# Patient Record
Sex: FEMALE | Race: WHITE | NOT HISPANIC OR LATINO | Employment: UNEMPLOYED | URBAN - METROPOLITAN AREA
[De-identification: names, ages, dates, MRNs, and addresses within clinical notes are randomized per-mention and may not be internally consistent; named-entity substitution may affect disease eponyms.]

---

## 2017-06-12 ENCOUNTER — APPOINTMENT (OUTPATIENT)
Dept: PREADMISSION TESTING | Facility: HOSPITAL | Age: 62
End: 2017-06-12

## 2017-06-12 VITALS
HEIGHT: 66 IN | OXYGEN SATURATION: 98 % | HEART RATE: 77 BPM | SYSTOLIC BLOOD PRESSURE: 148 MMHG | WEIGHT: 154 LBS | TEMPERATURE: 97.8 F | DIASTOLIC BLOOD PRESSURE: 88 MMHG | BODY MASS INDEX: 24.75 KG/M2

## 2017-06-12 LAB
ALBUMIN SERPL-MCNC: 4.4 G/DL (ref 3.5–5.2)
ALBUMIN/GLOB SERPL: 1.6 G/DL
ALP SERPL-CCNC: 49 U/L (ref 39–117)
ALT SERPL W P-5'-P-CCNC: 17 U/L (ref 1–33)
ANION GAP SERPL CALCULATED.3IONS-SCNC: 13.8 MMOL/L
AST SERPL-CCNC: 23 U/L (ref 1–32)
BASOPHILS # BLD AUTO: 0.05 10*3/MM3 (ref 0–0.2)
BASOPHILS NFR BLD AUTO: 1.1 % (ref 0–1.5)
BILIRUB SERPL-MCNC: 0.4 MG/DL (ref 0.1–1.2)
BUN BLD-MCNC: 16 MG/DL (ref 8–23)
BUN/CREAT SERPL: 19.8 (ref 7–25)
CALCIUM SPEC-SCNC: 9 MG/DL (ref 8.6–10.5)
CHLORIDE SERPL-SCNC: 100 MMOL/L (ref 98–107)
CO2 SERPL-SCNC: 25.2 MMOL/L (ref 22–29)
CREAT BLD-MCNC: 0.81 MG/DL (ref 0.57–1)
DEPRECATED RDW RBC AUTO: 46.3 FL (ref 37–54)
EOSINOPHIL # BLD AUTO: 0.16 10*3/MM3 (ref 0–0.7)
EOSINOPHIL NFR BLD AUTO: 3.5 % (ref 0.3–6.2)
ERYTHROCYTE [DISTWIDTH] IN BLOOD BY AUTOMATED COUNT: 12.8 % (ref 11.7–13)
GFR SERPL CREATININE-BSD FRML MDRD: 71 ML/MIN/1.73
GLOBULIN UR ELPH-MCNC: 2.7 GM/DL
GLUCOSE BLD-MCNC: 113 MG/DL (ref 65–99)
HCT VFR BLD AUTO: 40 % (ref 35.6–45.5)
HGB BLD-MCNC: 13.2 G/DL (ref 11.9–15.5)
IMM GRANULOCYTES # BLD: 0 10*3/MM3 (ref 0–0.03)
IMM GRANULOCYTES NFR BLD: 0 % (ref 0–0.5)
LYMPHOCYTES # BLD AUTO: 1.3 10*3/MM3 (ref 0.9–4.8)
LYMPHOCYTES NFR BLD AUTO: 28.1 % (ref 19.6–45.3)
MCH RBC QN AUTO: 32.5 PG (ref 26.9–32)
MCHC RBC AUTO-ENTMCNC: 33 G/DL (ref 32.4–36.3)
MCV RBC AUTO: 98.5 FL (ref 80.5–98.2)
MONOCYTES # BLD AUTO: 0.46 10*3/MM3 (ref 0.2–1.2)
MONOCYTES NFR BLD AUTO: 9.9 % (ref 5–12)
NEUTROPHILS # BLD AUTO: 2.66 10*3/MM3 (ref 1.9–8.1)
NEUTROPHILS NFR BLD AUTO: 57.4 % (ref 42.7–76)
PLATELET # BLD AUTO: 168 10*3/MM3 (ref 140–500)
PMV BLD AUTO: 13 FL (ref 6–12)
POTASSIUM BLD-SCNC: 3.9 MMOL/L (ref 3.5–5.2)
PROT SERPL-MCNC: 7.1 G/DL (ref 6–8.5)
RBC # BLD AUTO: 4.06 10*6/MM3 (ref 3.9–5.2)
SODIUM BLD-SCNC: 139 MMOL/L (ref 136–145)
WBC NRBC COR # BLD: 4.63 10*3/MM3 (ref 4.5–10.7)

## 2017-06-12 PROCEDURE — 36415 COLL VENOUS BLD VENIPUNCTURE: CPT

## 2017-06-12 PROCEDURE — 80053 COMPREHEN METABOLIC PANEL: CPT | Performed by: ORTHOPAEDIC SURGERY

## 2017-06-12 PROCEDURE — 93005 ELECTROCARDIOGRAM TRACING: CPT

## 2017-06-12 PROCEDURE — 85025 COMPLETE CBC W/AUTO DIFF WBC: CPT | Performed by: ORTHOPAEDIC SURGERY

## 2017-06-12 PROCEDURE — 93010 ELECTROCARDIOGRAM REPORT: CPT | Performed by: INTERNAL MEDICINE

## 2017-06-12 RX ORDER — ACETAMINOPHEN 500 MG
500 TABLET ORAL EVERY 6 HOURS PRN
Status: ON HOLD | COMMUNITY
End: 2022-05-17

## 2017-06-12 RX ORDER — LORATADINE 10 MG/1
10 CAPSULE, LIQUID FILLED ORAL DAILY PRN
COMMUNITY
End: 2022-05-23 | Stop reason: HOSPADM

## 2017-06-12 RX ORDER — ESCITALOPRAM OXALATE 10 MG/1
10 TABLET ORAL EVERY EVENING
COMMUNITY

## 2017-06-12 RX ORDER — OLMESARTAN MEDOXOMIL 20 MG/1
20 TABLET ORAL NIGHTLY
COMMUNITY
End: 2022-05-23 | Stop reason: HOSPADM

## 2017-06-12 RX ORDER — AMLODIPINE BESYLATE 5 MG/1
5 TABLET ORAL NIGHTLY
COMMUNITY
End: 2022-05-23 | Stop reason: HOSPADM

## 2017-06-12 NOTE — DISCHARGE INSTRUCTIONS
Take the following medications the morning of surgery with a small sip of water: NONE        General Instructions:  • Do not eat or drink anything after midnight the night before surgery.  • Bring any papers given to you in the doctor’s office.  • Wear clean comfortable clothes and socks.  • Do not wear contact lenses or make-up.  Bring a case for your glasses.   • Bring crutches or walker if applicable.  • Leave all other valuables and jewelry at home.  • The Pre-Admission Testing nurse will instruct you to bring medications if unable to obtain an accurate list in Pre-Admission Testing.            Preventing a Surgical Site Infection:  • For 2 to 3 days before surgery, avoid shaving with a razor because the razor can irritate skin and make it easier to develop an infection.  • The night prior to surgery sleep in a clean bed with clean clothing.  Do not allow pets to sleep with you.  • Shower on the morning of surgery using a fresh bar of anti-bacterial soap (such as Dial) and clean washcloth.  Dry with a clean towel and dress in clean clothing.  • Ask your surgeon if you will be receiving antibiotics prior to surgery.  • Make sure you, your family, and all healthcare providers clean their hands with soap and water or an alcohol based hand  before caring for you or your wound.    Day of surgery: 6/26/2017. [facility]. ARRIVAL TIME 545 AM  Upon arrival, a Pre-op nurse and Anesthesiologist will review your health history, obtain vital signs, and answer questions you may have.  The only belongings needed at this time will be your home medications and if applicable your C-PAP/BI-PAP machine.  If you are staying overnight your family can leave the rest of your belongings in the car and bring them to your room later.  A Pre-op nurse will start an IV and you may receive medication in preparation for surgery, including something to help you relax.  Your family will be able to see you in the Pre-op area.  While you are in  surgery your family should notify the waiting room  if they leave the waiting room area and provide a contact phone number.    Please be aware that surgery does come with discomfort.  We want to make every effort to control your discomfort so please discuss any uncontrolled symptoms with your nurse.   Your doctor will most likely have prescribed pain medications.          If you are staying overnight following surgery, you will be transported to your hospital room following the recovery period.  [facility] has all private rooms.    If you have any questions please call Pre-Admission Testing at [phone].  Deductibles and co-payments are collected on the day of service. Please be prepared to pay the required co-pay, deductible or deposit on the day of service as defined by your plan.

## 2017-06-26 ENCOUNTER — HOSPITAL ENCOUNTER (OUTPATIENT)
Facility: HOSPITAL | Age: 62
Setting detail: OBSERVATION
Discharge: HOME | End: 2017-06-27
Attending: ORTHOPAEDIC SURGERY | Admitting: ORTHOPAEDIC SURGERY

## 2017-06-26 ENCOUNTER — ANESTHESIA EVENT (OUTPATIENT)
Dept: PERIOP | Facility: HOSPITAL | Age: 62
End: 2017-06-26

## 2017-06-26 ENCOUNTER — APPOINTMENT (OUTPATIENT)
Dept: GENERAL RADIOLOGY | Facility: HOSPITAL | Age: 62
End: 2017-06-26

## 2017-06-26 ENCOUNTER — ANESTHESIA (OUTPATIENT)
Dept: PERIOP | Facility: HOSPITAL | Age: 62
End: 2017-06-26

## 2017-06-26 DIAGNOSIS — R26.2 DIFFICULTY WALKING: Primary | ICD-10-CM

## 2017-06-26 PROBLEM — M19.072 OSTEOARTHRITIS OF LEFT FOOT: Status: ACTIVE | Noted: 2017-06-26

## 2017-06-26 PROCEDURE — 25010000002 ONDANSETRON PER 1 MG: Performed by: NURSE ANESTHETIST, CERTIFIED REGISTERED

## 2017-06-26 PROCEDURE — C1713 ANCHOR/SCREW BN/BN,TIS/BN: HCPCS | Performed by: ORTHOPAEDIC SURGERY

## 2017-06-26 PROCEDURE — 25010000002 KETOROLAC TROMETHAMINE PER 15 MG: Performed by: ANESTHESIOLOGY

## 2017-06-26 PROCEDURE — 25010000002 DEXAMETHASONE PER 1 MG: Performed by: NURSE ANESTHETIST, CERTIFIED REGISTERED

## 2017-06-26 PROCEDURE — G0378 HOSPITAL OBSERVATION PER HR: HCPCS

## 2017-06-26 PROCEDURE — 25010000002 PROPOFOL 10 MG/ML EMULSION: Performed by: NURSE ANESTHETIST, CERTIFIED REGISTERED

## 2017-06-26 PROCEDURE — 25010000002 FENTANYL CITRATE (PF) 100 MCG/2ML SOLUTION: Performed by: ANESTHESIOLOGY

## 2017-06-26 PROCEDURE — 25010000002 ROPIVACAINE PER 1 MG: Performed by: ANESTHESIOLOGY

## 2017-06-26 PROCEDURE — 73630 X-RAY EXAM OF FOOT: CPT

## 2017-06-26 PROCEDURE — 25010000002 MIDAZOLAM PER 1 MG: Performed by: ANESTHESIOLOGY

## 2017-06-26 PROCEDURE — 25010000003 CEFAZOLIN IN DEXTROSE 2-4 GM/100ML-% SOLUTION: Performed by: NURSE PRACTITIONER

## 2017-06-26 PROCEDURE — 76000 FLUOROSCOPY <1 HR PHYS/QHP: CPT

## 2017-06-26 PROCEDURE — 73600 X-RAY EXAM OF ANKLE: CPT

## 2017-06-26 PROCEDURE — 94799 UNLISTED PULMONARY SVC/PX: CPT

## 2017-06-26 DEVICE — IMPLANTABLE DEVICE: Type: IMPLANTABLE DEVICE | Status: FUNCTIONAL

## 2017-06-26 DEVICE — PLT EDGELOCK: Type: IMPLANTABLE DEVICE | Status: FUNCTIONAL

## 2017-06-26 RX ORDER — OXYCODONE HYDROCHLORIDE AND ACETAMINOPHEN 5; 325 MG/1; MG/1
1 TABLET ORAL EVERY 4 HOURS PRN
Status: DISCONTINUED | OUTPATIENT
Start: 2017-06-26 | End: 2017-06-27 | Stop reason: HOSPADM

## 2017-06-26 RX ORDER — ONDANSETRON 2 MG/ML
INJECTION INTRAMUSCULAR; INTRAVENOUS AS NEEDED
Status: DISCONTINUED | OUTPATIENT
Start: 2017-06-26 | End: 2017-06-26 | Stop reason: SURG

## 2017-06-26 RX ORDER — AMLODIPINE BESYLATE 5 MG/1
5 TABLET ORAL EVERY EVENING
Status: DISCONTINUED | OUTPATIENT
Start: 2017-06-26 | End: 2017-06-27 | Stop reason: HOSPADM

## 2017-06-26 RX ORDER — ACETAMINOPHEN 325 MG/1
650 TABLET ORAL EVERY 4 HOURS PRN
Status: DISCONTINUED | OUTPATIENT
Start: 2017-06-26 | End: 2017-06-27 | Stop reason: HOSPADM

## 2017-06-26 RX ORDER — NALOXONE HCL 0.4 MG/ML
0.2 VIAL (ML) INJECTION AS NEEDED
Status: DISCONTINUED | OUTPATIENT
Start: 2017-06-26 | End: 2017-06-26 | Stop reason: HOSPADM

## 2017-06-26 RX ORDER — MIDAZOLAM HYDROCHLORIDE 1 MG/ML
1 INJECTION INTRAMUSCULAR; INTRAVENOUS
Status: DISCONTINUED | OUTPATIENT
Start: 2017-06-26 | End: 2017-06-26 | Stop reason: HOSPADM

## 2017-06-26 RX ORDER — ROPIVACAINE HYDROCHLORIDE 5 MG/ML
INJECTION, SOLUTION EPIDURAL; INFILTRATION; PERINEURAL AS NEEDED
Status: DISCONTINUED | OUTPATIENT
Start: 2017-06-26 | End: 2017-06-26 | Stop reason: SURG

## 2017-06-26 RX ORDER — DIPHENHYDRAMINE HYDROCHLORIDE 50 MG/ML
12.5 INJECTION INTRAMUSCULAR; INTRAVENOUS
Status: DISCONTINUED | OUTPATIENT
Start: 2017-06-26 | End: 2017-06-26 | Stop reason: HOSPADM

## 2017-06-26 RX ORDER — FLUMAZENIL 0.1 MG/ML
0.2 INJECTION INTRAVENOUS AS NEEDED
Status: DISCONTINUED | OUTPATIENT
Start: 2017-06-26 | End: 2017-06-26 | Stop reason: HOSPADM

## 2017-06-26 RX ORDER — OXYCODONE HYDROCHLORIDE AND ACETAMINOPHEN 5; 325 MG/1; MG/1
2 TABLET ORAL EVERY 4 HOURS PRN
Status: DISCONTINUED | OUTPATIENT
Start: 2017-06-26 | End: 2017-06-27 | Stop reason: HOSPADM

## 2017-06-26 RX ORDER — HYDRALAZINE HYDROCHLORIDE 20 MG/ML
5 INJECTION INTRAMUSCULAR; INTRAVENOUS
Status: DISCONTINUED | OUTPATIENT
Start: 2017-06-26 | End: 2017-06-26 | Stop reason: HOSPADM

## 2017-06-26 RX ORDER — ESCITALOPRAM OXALATE 10 MG/1
10 TABLET ORAL EVERY EVENING
Status: DISCONTINUED | OUTPATIENT
Start: 2017-06-26 | End: 2017-06-27 | Stop reason: HOSPADM

## 2017-06-26 RX ORDER — HYDROCODONE BITARTRATE AND ACETAMINOPHEN 7.5; 325 MG/1; MG/1
1 TABLET ORAL ONCE AS NEEDED
Status: DISCONTINUED | OUTPATIENT
Start: 2017-06-26 | End: 2017-06-26 | Stop reason: HOSPADM

## 2017-06-26 RX ORDER — SODIUM CHLORIDE, SODIUM LACTATE, POTASSIUM CHLORIDE, CALCIUM CHLORIDE 600; 310; 30; 20 MG/100ML; MG/100ML; MG/100ML; MG/100ML
100 INJECTION, SOLUTION INTRAVENOUS CONTINUOUS
Status: DISCONTINUED | OUTPATIENT
Start: 2017-06-26 | End: 2017-06-27 | Stop reason: HOSPADM

## 2017-06-26 RX ORDER — BUPIVACAINE HYDROCHLORIDE AND EPINEPHRINE 2.5; 5 MG/ML; UG/ML
INJECTION, SOLUTION EPIDURAL; INFILTRATION; INTRACAUDAL; PERINEURAL AS NEEDED
Status: DISCONTINUED | OUTPATIENT
Start: 2017-06-26 | End: 2017-06-26 | Stop reason: SURG

## 2017-06-26 RX ORDER — KETOROLAC TROMETHAMINE 15 MG/ML
15 INJECTION, SOLUTION INTRAMUSCULAR; INTRAVENOUS EVERY 8 HOURS
Status: DISCONTINUED | OUTPATIENT
Start: 2017-06-26 | End: 2017-06-27 | Stop reason: HOSPADM

## 2017-06-26 RX ORDER — FENTANYL CITRATE 50 UG/ML
50 INJECTION, SOLUTION INTRAMUSCULAR; INTRAVENOUS
Status: DISCONTINUED | OUTPATIENT
Start: 2017-06-26 | End: 2017-06-26 | Stop reason: HOSPADM

## 2017-06-26 RX ORDER — EPHEDRINE SULFATE 50 MG/ML
INJECTION, SOLUTION INTRAVENOUS AS NEEDED
Status: DISCONTINUED | OUTPATIENT
Start: 2017-06-26 | End: 2017-06-26 | Stop reason: SURG

## 2017-06-26 RX ORDER — ACETAMINOPHEN 500 MG
500 TABLET ORAL EVERY 6 HOURS PRN
Status: DISCONTINUED | OUTPATIENT
Start: 2017-06-26 | End: 2017-06-27 | Stop reason: HOSPADM

## 2017-06-26 RX ORDER — PROMETHAZINE HYDROCHLORIDE 25 MG/1
12.5 TABLET ORAL ONCE AS NEEDED
Status: DISCONTINUED | OUTPATIENT
Start: 2017-06-26 | End: 2017-06-26 | Stop reason: HOSPADM

## 2017-06-26 RX ORDER — ASPIRIN 325 MG
325 TABLET, DELAYED RELEASE (ENTERIC COATED) ORAL DAILY
Status: DISCONTINUED | OUTPATIENT
Start: 2017-06-27 | End: 2017-06-27 | Stop reason: HOSPADM

## 2017-06-26 RX ORDER — SODIUM CHLORIDE, SODIUM LACTATE, POTASSIUM CHLORIDE, CALCIUM CHLORIDE 600; 310; 30; 20 MG/100ML; MG/100ML; MG/100ML; MG/100ML
9 INJECTION, SOLUTION INTRAVENOUS CONTINUOUS
Status: DISCONTINUED | OUTPATIENT
Start: 2017-06-26 | End: 2017-06-27 | Stop reason: HOSPADM

## 2017-06-26 RX ORDER — OLMESARTAN MEDOXOMIL 20 MG/1
20 TABLET ORAL EVERY EVENING
Status: DISCONTINUED | OUTPATIENT
Start: 2017-06-26 | End: 2017-06-27 | Stop reason: HOSPADM

## 2017-06-26 RX ORDER — FAMOTIDINE 10 MG/ML
20 INJECTION, SOLUTION INTRAVENOUS ONCE
Status: COMPLETED | OUTPATIENT
Start: 2017-06-26 | End: 2017-06-26

## 2017-06-26 RX ORDER — CEFAZOLIN SODIUM 2 G/100ML
2 INJECTION, SOLUTION INTRAVENOUS EVERY 8 HOURS
Status: COMPLETED | OUTPATIENT
Start: 2017-06-26 | End: 2017-06-27

## 2017-06-26 RX ORDER — PROMETHAZINE HYDROCHLORIDE 25 MG/1
25 TABLET ORAL EVERY 4 HOURS PRN
Status: DISCONTINUED | OUTPATIENT
Start: 2017-06-26 | End: 2017-06-27 | Stop reason: HOSPADM

## 2017-06-26 RX ORDER — LABETALOL HYDROCHLORIDE 5 MG/ML
5 INJECTION, SOLUTION INTRAVENOUS
Status: DISCONTINUED | OUTPATIENT
Start: 2017-06-26 | End: 2017-06-26 | Stop reason: HOSPADM

## 2017-06-26 RX ORDER — ONDANSETRON 2 MG/ML
4 INJECTION INTRAMUSCULAR; INTRAVENOUS ONCE AS NEEDED
Status: DISCONTINUED | OUTPATIENT
Start: 2017-06-26 | End: 2017-06-26 | Stop reason: HOSPADM

## 2017-06-26 RX ORDER — PROMETHAZINE HYDROCHLORIDE 25 MG/ML
12.5 INJECTION, SOLUTION INTRAMUSCULAR; INTRAVENOUS ONCE AS NEEDED
Status: DISCONTINUED | OUTPATIENT
Start: 2017-06-26 | End: 2017-06-26 | Stop reason: HOSPADM

## 2017-06-26 RX ORDER — CETIRIZINE HYDROCHLORIDE 10 MG/1
10 TABLET ORAL DAILY
Status: DISCONTINUED | OUTPATIENT
Start: 2017-06-27 | End: 2017-06-27 | Stop reason: HOSPADM

## 2017-06-26 RX ORDER — PROMETHAZINE HYDROCHLORIDE 25 MG/1
25 TABLET ORAL ONCE AS NEEDED
Status: DISCONTINUED | OUTPATIENT
Start: 2017-06-26 | End: 2017-06-26 | Stop reason: HOSPADM

## 2017-06-26 RX ORDER — EPHEDRINE SULFATE 50 MG/ML
5 INJECTION, SOLUTION INTRAVENOUS ONCE AS NEEDED
Status: DISCONTINUED | OUTPATIENT
Start: 2017-06-26 | End: 2017-06-26 | Stop reason: HOSPADM

## 2017-06-26 RX ORDER — SODIUM CHLORIDE 0.9 % (FLUSH) 0.9 %
1-10 SYRINGE (ML) INJECTION AS NEEDED
Status: DISCONTINUED | OUTPATIENT
Start: 2017-06-26 | End: 2017-06-26 | Stop reason: HOSPADM

## 2017-06-26 RX ORDER — OXYCODONE AND ACETAMINOPHEN 7.5; 325 MG/1; MG/1
1 TABLET ORAL ONCE AS NEEDED
Status: DISCONTINUED | OUTPATIENT
Start: 2017-06-26 | End: 2017-06-26 | Stop reason: HOSPADM

## 2017-06-26 RX ORDER — ZOLPIDEM TARTRATE 5 MG/1
5 TABLET ORAL NIGHTLY PRN
Status: DISCONTINUED | OUTPATIENT
Start: 2017-06-26 | End: 2017-06-27 | Stop reason: HOSPADM

## 2017-06-26 RX ORDER — LIDOCAINE HYDROCHLORIDE 20 MG/ML
INJECTION, SOLUTION INFILTRATION; PERINEURAL AS NEEDED
Status: DISCONTINUED | OUTPATIENT
Start: 2017-06-26 | End: 2017-06-26 | Stop reason: SURG

## 2017-06-26 RX ORDER — PROMETHAZINE HYDROCHLORIDE 25 MG/ML
25 INJECTION, SOLUTION INTRAMUSCULAR; INTRAVENOUS EVERY 4 HOURS PRN
Status: DISCONTINUED | OUTPATIENT
Start: 2017-06-26 | End: 2017-06-27 | Stop reason: HOSPADM

## 2017-06-26 RX ORDER — HYDROMORPHONE HYDROCHLORIDE 1 MG/ML
0.5 INJECTION, SOLUTION INTRAMUSCULAR; INTRAVENOUS; SUBCUTANEOUS
Status: DISCONTINUED | OUTPATIENT
Start: 2017-06-26 | End: 2017-06-26 | Stop reason: HOSPADM

## 2017-06-26 RX ORDER — MIDAZOLAM HYDROCHLORIDE 1 MG/ML
2 INJECTION INTRAMUSCULAR; INTRAVENOUS
Status: DISCONTINUED | OUTPATIENT
Start: 2017-06-26 | End: 2017-06-26 | Stop reason: HOSPADM

## 2017-06-26 RX ORDER — PROPOFOL 10 MG/ML
VIAL (ML) INTRAVENOUS AS NEEDED
Status: DISCONTINUED | OUTPATIENT
Start: 2017-06-26 | End: 2017-06-26 | Stop reason: SURG

## 2017-06-26 RX ORDER — DEXAMETHASONE SODIUM PHOSPHATE 10 MG/ML
INJECTION INTRAMUSCULAR; INTRAVENOUS AS NEEDED
Status: DISCONTINUED | OUTPATIENT
Start: 2017-06-26 | End: 2017-06-26 | Stop reason: SURG

## 2017-06-26 RX ORDER — PROMETHAZINE HYDROCHLORIDE 25 MG/1
25 SUPPOSITORY RECTAL ONCE AS NEEDED
Status: DISCONTINUED | OUTPATIENT
Start: 2017-06-26 | End: 2017-06-26 | Stop reason: HOSPADM

## 2017-06-26 RX ADMIN — SODIUM CHLORIDE, POTASSIUM CHLORIDE, SODIUM LACTATE AND CALCIUM CHLORIDE 9 ML/HR: 600; 310; 30; 20 INJECTION, SOLUTION INTRAVENOUS at 06:23

## 2017-06-26 RX ADMIN — MIDAZOLAM 2 MG: 1 INJECTION INTRAMUSCULAR; INTRAVENOUS at 06:53

## 2017-06-26 RX ADMIN — KETOROLAC TROMETHAMINE 15 MG: 30 INJECTION, SOLUTION INTRAMUSCULAR at 15:48

## 2017-06-26 RX ADMIN — EPHEDRINE SULFATE 10 MG: 50 INJECTION INTRAMUSCULAR; INTRAVENOUS; SUBCUTANEOUS at 08:08

## 2017-06-26 RX ADMIN — PROPOFOL 150 MG: 10 INJECTION, EMULSION INTRAVENOUS at 07:36

## 2017-06-26 RX ADMIN — OXYCODONE HYDROCHLORIDE AND ACETAMINOPHEN 2 TABLET: 5; 325 TABLET ORAL at 17:39

## 2017-06-26 RX ADMIN — SODIUM CHLORIDE, POTASSIUM CHLORIDE, SODIUM LACTATE AND CALCIUM CHLORIDE: 600; 310; 30; 20 INJECTION, SOLUTION INTRAVENOUS at 08:53

## 2017-06-26 RX ADMIN — ESCITALOPRAM 10 MG: 10 TABLET, FILM COATED ORAL at 17:38

## 2017-06-26 RX ADMIN — FAMOTIDINE 20 MG: 10 INJECTION, SOLUTION INTRAVENOUS at 07:18

## 2017-06-26 RX ADMIN — EPHEDRINE SULFATE 10 MG: 50 INJECTION INTRAMUSCULAR; INTRAVENOUS; SUBCUTANEOUS at 08:45

## 2017-06-26 RX ADMIN — ROPIVACAINE HYDROCHLORIDE 25 ML: 5 INJECTION, SOLUTION EPIDURAL; INFILTRATION; PERINEURAL at 07:15

## 2017-06-26 RX ADMIN — AMLODIPINE BESYLATE 5 MG: 5 TABLET ORAL at 20:00

## 2017-06-26 RX ADMIN — CEFAZOLIN SODIUM 2 G: 2 INJECTION, SOLUTION INTRAVENOUS at 07:34

## 2017-06-26 RX ADMIN — ROPIVACAINE HYDROCHLORIDE 8 ML/HR: 2 INJECTION, SOLUTION EPIDURAL; INFILTRATION at 11:36

## 2017-06-26 RX ADMIN — LIDOCAINE HYDROCHLORIDE 60 MG: 20 INJECTION, SOLUTION INFILTRATION; PERINEURAL at 07:36

## 2017-06-26 RX ADMIN — EPHEDRINE SULFATE 10 MG: 50 INJECTION INTRAMUSCULAR; INTRAVENOUS; SUBCUTANEOUS at 08:20

## 2017-06-26 RX ADMIN — ONDANSETRON 4 MG: 2 INJECTION INTRAMUSCULAR; INTRAVENOUS at 09:42

## 2017-06-26 RX ADMIN — FENTANYL CITRATE 50 MCG: 50 INJECTION INTRAMUSCULAR; INTRAVENOUS at 06:54

## 2017-06-26 RX ADMIN — OLMESARTAN MEDOXOMIL 20 MG: 20 TABLET, FILM COATED ORAL at 20:00

## 2017-06-26 RX ADMIN — DEXAMETHASONE SODIUM PHOSPHATE 4 MG: 10 INJECTION INTRAMUSCULAR; INTRAVENOUS at 09:42

## 2017-06-26 RX ADMIN — BUPIVACAINE HYDROCHLORIDE AND EPINEPHRINE BITARTRATE 20 ML: 2.5; .0091 INJECTION, SOLUTION EPIDURAL; INFILTRATION; INTRACAUDAL; PERINEURAL at 07:15

## 2017-06-26 RX ADMIN — CEFAZOLIN SODIUM 2 G: 2 INJECTION, SOLUTION INTRAVENOUS at 15:49

## 2017-06-26 RX ADMIN — ZOLPIDEM TARTRATE 5 MG: 5 TABLET, FILM COATED ORAL at 22:47

## 2017-06-26 NOTE — ANESTHESIA PROCEDURE NOTES
Peripheral Block    Patient location during procedure: holding area  Reason for block: at surgeon's request and post-op pain management  Performed by  Anesthesiologist: HALEY WILLIAMSON  Preanesthetic Checklist  Completed: patient identified, site marked, surgical consent, pre-op evaluation, timeout performed, IV checked, risks and benefits discussed and monitors and equipment checked  Peripheral Block Prep:  Sterile barriers:cap, gloves and mask  Prep: ChloraPrep  Patient monitoring: blood pressure monitoring, continuous pulse oximetry and EKG  Peripheral Procedure  Sedation:yes  Guidance:ultrasound guided  Images:still images obtained    Laterality:left  Block Type:femoral and adductor canal block  Injection Technique:single-shot  Needle Type:echogenic  ULTRASOUND INTERPRETATION.  Using ultrasound guidance a 21 G gauge needle was placed in close proximity to the femoral nerve, at which point, under ultrasound guidance anesthetic was injected in the area of the nerve and spread of the anesthesia was seen on ultrasound in close proximity thereto.  There were no abnormalities seen on ultrasound; a digital image was taken; and the patient tolerated the procedure with no complications.   Medications  Local Injected:bupivacaine 0.25% Local Amount Injected:20mL  Post Assessment  Injection Assessment: negative aspiration for heme, no paresthesia on injection and incremental injection  Patient Tolerance:comfortable throughout block  Complications:no  Additional Notes  Ultrasound Interpretation:  Using ultrasound guidance the needle was placed in close proximity to the femoral nerve and anesthetic was injected in the area of the femoral nerve and spread of the anesthetic was seen on ultrasound in close proximity thereto.  There were no abnormalities seen on ultrasound; a digital image was taken; and the patient tolerated the procedure with no complications.    Block placed for postoperative pain control per surgeon  request.

## 2017-06-26 NOTE — ANESTHESIA PROCEDURE NOTES
Peripheral Block    Patient location during procedure: holding area  Reason for block: at surgeon's request and post-op pain management  Performed by  Anesthesiologist: HALEY WILLIAMSON  Preanesthetic Checklist  Completed: patient identified, site marked, surgical consent, pre-op evaluation, timeout performed, IV checked, risks and benefits discussed and monitors and equipment checked  Peripheral Block Prep:  Sterile barriers:cap, mask and gloves  Prep: ChloraPrep  Patient monitoring: blood pressure monitoring, continuous pulse oximetry and EKG  Peripheral Procedure  Sedation:yes  Guidance:ultrasound guided  Images:still images obtained    Laterality:left  Block Type:popliteal  Injection Technique:catheter  Needle Type:echogenic  Needle Gauge:18 G  Catheter Size:20 GULTRASOUND INTERPRETATION.  Using ultrasound guidance a 21 G gauge needle was placed in close proximity to the sciatic nerve, at which point, under ultrasound guidance anesthetic was injected in the area of the nerve and spread of the anesthesia was seen on ultrasound in close proximity thereto.  There were no abnormalities seen on ultrasound; a digital image was taken; and the patient tolerated the procedure with no complications.   Medications  Local Injected:ropivacaine 0.5% without epinephrine Local Amount Injected:25mL  Post Assessment  Injection Assessment: negative aspiration for heme, no paresthesia on injection and incremental injection  Patient Tolerance:comfortable throughout block  Complications:no  Additional Notes  Ultrasound Interpretation:  Using ultrasound guidance the needle was placed in close proximity to the popliteal/sciatic nerve and anesthetic was injected in the area of the  popliteal/sciatic nerve and spread of the anesthetic was seen on ultrasound in close proximity thereto.  There were no abnormalities seen on ultrasound; a digital image was taken; and the patient tolerated the procedure with no complications.    Block  placed for postoperative pain control per surgeon request.

## 2017-06-26 NOTE — ANESTHESIA PREPROCEDURE EVALUATION
Anesthesia Evaluation     Patient summary reviewed and Nursing notes reviewed   no history of anesthetic complications:  NPO Solid Status: > 8 hours  NPO Liquid Status: > 2 hours     Airway   Mallampati: II  TM distance: >3 FB  Neck ROM: full  Dental - normal exam     Pulmonary - negative pulmonary ROS and normal exam   Cardiovascular - normal exam    (+) hypertension, hyperlipidemia      Neuro/Psych  (+) psychiatric history Anxiety and Depression,    GI/Hepatic/Renal/Endo      Musculoskeletal     Abdominal    Substance History      OB/GYN          Other   (+) arthritis   history of cancer                                    Anesthesia Plan    ASA 2     general and regional     Anesthetic plan and risks discussed with patient.

## 2017-06-26 NOTE — OP NOTE
Date of Procedure:  06/26/2017     PREOPERATIVE DIAGNOSES:   1. 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   2. Severe pes planovalgus contractures.     POSTOPERATIVE DIAGNOSES:   1. 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   2. Severe pes planovalgus contractures.     PROCEDURES PERFORMED:   1. Left peroneus brevis to peroneus longus tendon transfer.   2. Left tendo-Achilles lengthening.   3. Left medial displacement calcaneal osteotomy.   4. Left distal tibia supramalleolar osteotomy.   5. Left 1st metatarsal Cotton osteotomy.   6. Large autogenous bone graft.     ANESTHESIA: General anesthesia with left lower extremity nerve block and catheter for postoperative pain relief.     SURGEON: Ok Ferrari MD     ASSISTANT: JAKE Antonio    COMPLICATION: None.     SPECIMEN: None.     DRAIN: A drain was used.     NOTE: A skilled first surgical assistant, namely JAKE Antonio, was necessary for the safe completion of this procedure in a timely fashion. This was a very complex procedure requiring her assistance in holding osteotomies in excellent position and alignment as I provided internal fixation. She also was instrumental in protecting vital neurovascular structures while I worked. She helped with positioning of the patient.     DESCRIPTION OF PROCEDURE: After appropriate preoperative consultation and after a left lower extremity nerve block was done for postoperative pain relief the patient was brought to the operating room and made comfortable in the supine position on the operating room table. Parenteral antibiotics were given and a surgical pause was undertaken to identify appropriate patient, surgical site, and surgeon. After appropriate anesthesia a well-padded left proximal thigh tourniquet was applied. A well-padded bump was placed beneath  the left hip in order to slightly internally rotate the left lower extremity. Intraoperative C-arm fluoroscopic images, spot films, and plain films were available for reference throughout the case. Prep and drape of the left lower extremity in the usual sterile free fashion was done from the knee distally. Skin incisions were designed with a marking pen. The limb was elevated, partially exsanguinated by gravity, and the tourniquet inflated to 250 mmHg.     The 1st incision was supramalleolar and just behind the left fibula through skin and dermis only. Blunt dissection was taken deep to that and I opened the sheath over the peroneus brevis and longus tendons. The peroneus brevis was a very deforming force. It was tenotomized distal to the level of anastomosis. With the foot and ankle held in appropriate posture I transferred the peroneus brevis into and through the peroneus longus tendon in a Pulvertaft tendon weave fashion. Sutures of 0 Ethibond were used to secure this in place.     Next I made an oblique incision through skin and dermis only over the lateral side of the left heel. Blunt dissection was taken deep to the dermis, protecting cuticular nerves. I used cautery. The periosteum was elevated only on the lateral tuberosity and retractors placed. A microsagittal saw was then used to perform a calcaneal osteotomy up to but not quite through the medial cortex. Bone was noted to be slightly osteopenic.     The osteotomy was finished through the medial cortex with blunt instruments and I was able to do a calcaneal slide, moving the tuberosity medial a good distance and then fixing it quite well internally with an OrthoHelix EdgeLock device and 2 screws. Overhanging bone from the residual distal piece was removed with the rongeur and added to bone graft to be used later in the case at the level of the tibia through a separate incision. These 2 lateral wounds were then irrigated. Hemostasis was obtained. Fascia  was left open, but the dermis was reapproximated with simple inverted sutures of 2-0 Vicryl so that skin edges could be apposed and everted without tension using staples.     Next a judicious lengthening of the tendo-Achilles was done through 3 incisions totaling in length 2.5 cm. We took care not to overcorrect, and with the heel in neutral now this provided 10° of left ankle dorsiflexion and 40° of plantarflexion. This took care of her most severe equinus and valgus contractures.     Next I approached the medial side of the left distal tibia. A skin incision was made from the tip of the medial malleolus through skin and dermis only to well proximal at the supramalleolar level. Blunt dissection was taken deep to this. Cuticular nerves were spared. I purposefully placed this incision as to avoid the saphenous nerve and the greater saphenous vein which were protected.     Next using C-arm fluoroscopic guidance I made periosteal elevation in the medial supramalleolar tibia only and passed a smooth guidewire parallel to the tibiotalar malunited articular surface. She had significant valgus here. This 1st wire was passed distally at the level appropriate for later fixation with the plate and screws I had chosen.     Next I calculated the appropriate distance and passed at a converging angle a 2nd more proximal guidewire from medial to lateral. This outlined very nicely the closing wedge supramalleolar osteotomy we were hoping to perform here.     Next the saw blade was taken after placing retractors to protect neurovascular and tendon structures. The saw blade was passed along these guidewires from medial to lateral going right up to but not quite all the way through the lateral cortex. In this fashion a wedge of bone was removed and added to the bone graft.     The osteotomy was greenstick-closed into nice neutral posture in all 3 planes, correcting her malunited tibia and fibula fractures.     This was then fixed with  a low-profile periarticular Remberto-Biomet medial tibial plate. I first gained purchase with 3 fully threaded full-length cancellous screws of the distal segment. This was again extraarticular, but supramalleolar. I then completed the osteotomy with curette and rongeur, closing it down nicely into neutral posture, and passing bicortical screws from medial to lateral proximal to the osteotomy. The 1st screw was placed to take advantage of the dynamic compression slot in the plate. Fluoroscopic guidance was used as we went along.     This calcaneal and distal tibial osteotomy corrected the patient's poor valgus biomechanical axis quite nicely, but left her with, as we expected, some residual fixed forefoot varus. The remainder of her transverse tarsal and ankle joints was quite supple, but I designed a Cotton osteotomy.     Another incision was made over the dorsum of the left midfoot and forefoot through skin and dermis only. The deeper dissection was carried out between the extensor hallucis longus and extensor hallucis brevis onto the dorsum of the metaphyseal base of the 1st metatarsal. I identified without opening the 1st TMT joint.     I next chose an Arthrex LPS plate that had a 5 mm spacer. I was able to make an osteotomy with a microsagittal saw parallel to the 1st TMT joint up to but not through the plantar cortex, protecting neurovascular structures. The osteotomy was greenstick-opened and then fixed with the LPS plate and 4 screws. This gave the patient a nice plantigrade foot, restoring the arch and the biomechanical axis without overcorrection. All these wounds were irrigated and hemostasis was obtained. Bone graft that had been harvested from the distal tibia and from the calcaneus was then used to pack in and fill completely the Cotton osteotomy of the metatarsal. Any bone graft left over was packed in and around the tibial osteotomy. Circulation returned promptly upon releasing the tourniquet. The  wounds were then closed with Vicryl and staples. A sterile dressing was applied including posterior plaster splints. The patient tolerated the procedure well. We took great care to make sure she had a plantigrade foot at the end of the case. She left the operating room comfortable with her block in effect.       VICKIE FeltonQ:ab  D:   06/26/2017 18:00:28  T:   06/26/2017 19:09:43  Job ID:   [id number]  Document ID:   [id number]  cc:

## 2017-06-26 NOTE — PLAN OF CARE
Problem: Patient Care Overview (Adult)  Goal: Plan of Care Review  Outcome: Ongoing (interventions implemented as appropriate)    06/26/17 3593   Outcome Evaluation   Outcome Summary/Follow up Plan PT VOIDING, AMBULATED TO BATHROOM, NWB LLE, ADEQUATE PO INTAKE, PAIN CONTROLLED, ON Q PUMP, VSS- NO S/S OF HTN NOTED.        Goal: Adult Individualization and Mutuality  Outcome: Ongoing (interventions implemented as appropriate)  Goal: Discharge Needs Assessment  Outcome: Ongoing (interventions implemented as appropriate)    Problem: Self-Care Deficit (Adult,Obstetrics,Pediatric)  Goal: Improved Ability to Perform BADL and IADL  Outcome: Ongoing (interventions implemented as appropriate)    Problem: Fall Risk (Adult)  Goal: Absence of Falls  Outcome: Ongoing (interventions implemented as appropriate)    Problem: Pain, Acute (Adult)  Goal: Acceptable Pain Control/Comfort Level  Outcome: Ongoing (interventions implemented as appropriate)

## 2017-06-26 NOTE — ANESTHESIA POSTPROCEDURE EVALUATION
Patient: Grazyna Fritz    Procedure Summary     Date Anesthesia Start Anesthesia Stop Room / Location    06/26/17 0734 1010  [facility]  /  [facility]       Procedure Diagnosis Surgeon Provider    LT TIBIAL CALCANEAL & MID FOOT OSTEOTOMIES W/ BONE GRAFT LT PERONEUS BREVIS LONGUS TENDON TRANSFER TENDO ACHILLES LENGTHENING  (Left Foot) No diagnosis on file. MD Vinod Lane MD          Anesthesia Type: general, regional  Last vitals  BP      Temp      Pulse     Resp      SpO2        Post Anesthesia Care and Evaluation    Patient location during evaluation: bedside  Patient participation: complete - patient participated  Level of consciousness: awake  Pain score: 2  Pain management: adequate  Airway patency: patent  Anesthetic complications: No anesthetic complications  PONV Status: none  Cardiovascular status: acceptable  Respiratory status: acceptable  Hydration status: acceptable

## 2017-06-26 NOTE — H&P
History & Physical       Patient: Grazyna Fritz    Date of Admission: 6/26/2017  5:41 AM    YOB: 1955    Medical Record Number: [id number]    Attending Physician: Ok Ferrari MD        Chief Complaints: left foot pain, osteoarthritis peroneal tendon tear      History of Present Illness: 62 y.o. female presents with LT TIBIAL CALCANEAL & MID FOOT OSTEOTOMIES W/ BONE GRAFT LT PERONEUS BREVIS LONGUS TENDON TRANSFER TENDO ACHILLES LENGTHENING . Onset of symptoms was gradual starting unknown years ago.  Symptoms are associated with paain.  Symptoms are aggravated by activity.   Symptoms improve with rest. Patient is now being admitted to the services of Ok Ferrari MD for further evaluation and treatment.     Allergies: No Known Allergies    Medications:   Home Medications:  No current facility-administered medications on file prior to encounter.      No current outpatient prescriptions on file prior to encounter.     Current Medications:  Scheduled Meds:  famotidine 20 mg Intravenous Once     Continuous Infusions:  lactated ringers 9 mL/hr     PRN Meds:.fentanyl  •  midazolam **OR** midazolam  •  sodium chloride    Past Medical History:   Diagnosis Date   • Anxiety and depression    • Arthritis     OSTEO. HANDS   • Basal cell carcinoma of skin     REMOVED FROM FACE   • Elevated cholesterol    • Heartburn     ON [facility]   • Hypertension    • Left foot pain    • Seasonal allergies    • Urge incontinence         Past Surgical History:   Procedure Laterality Date   • BUNIONECTOMY Right    • COLONOSCOPY     • LAPAROSCOPIC CHOLECYSTECTOMY     • SKIN CANCER EXCISION  2001    FACE   • TUBAL ABDOMINAL LIGATION          Social History     Occupational History   • Not on file.     Social History Main Topics   • Smoking status: Never Smoker   • Smokeless tobacco: Never Used   • Alcohol use No   • Drug use: No   • Sexual activity: Not on file    Social History     Social History Narrative        Family History    Problem Relation Age of Onset   • Malig Hyperthermia Neg Hx          Review of Systems:   HEENT: Patient denies any headaches, vision changes, change in hearing, or tinnitus, Patient denies any rhinorrhea,epistaxis, sinus pain, mouth or dental problems, sore throat or hoarseness, or dysphagia  Pulmonary: Patient denies any cough, congestion, SOA, or wheezing  Cardiovascular: Patient denies any chest pain, dyspnea, palpitations, weakness, intolerance of exercise, varicosities, swelling of extremities, known murmur  Gastrointestinal:  Patient denies nausea, vomiting, diarrhea, constipation, loss  of appetite, change in appetite, dysphagia, gas, heartburn, melena, change in bowel habits, use of laxatives or other drugs to alter the function of the gastrointestinal tract.  Genital/Urinary: Patient denies dysuria, change in color of urine, change in frequency of urination, pain with urgency, incontinence, retention, or nocturia.  Musculoskeletal: Patient denies increased warmth; redness; or swelling of joints; limitation of function; deformity; crepitation: pain in a joint or an extremity, the neck, or the back, especially with movement.  Neurological: Patient denies dizziness, tremor, ataxia, difficulty in speaking, change in speech, paresthesia, loss of sensation, seizures, syncope, changes in memory.  Endocrine system: Patient denies tremors, palpitations, intolerance of heat or cold, polyuria, polydipsia, polyphagia, diaphoresis, exophthalmos, or goiter.  Psychological: Patient denies thoughts/plans or harming self or other; depression,  insomnia, night terrors, adam, memory loss, disorientation.  Skin: Patient denies any bruising, rashes, discoloration, pruritus, wounds, ulcers, decubiti, changes in the hair or nails  Hematopoietic: Patient denies history of spontaneous or excessive bleeding, epistaxis, hematuria, melena, fatigue, enlarged or tender lymph nodes, pallor, history of anemia.    Physical Exam: 62  y.o. female  General Appearance:    Alert, cooperative, in no acute distress                    Vitals:    06/26/17 0610   BP: 153/82   BP Location: Left arm   Patient Position: Lying   Pulse: 83   Resp: 16   Temp: 97.9 °F (36.6 °C)   TempSrc: Oral   SpO2: 97%   Weight: 156 lb (70.8 kg)        Head:    Normocephalic, without obvious abnormality, atraumatic   Eyes:            Lids and lashes normal, conjunctivae and sclerae normal, no   icterus, no pallor, corneas clear, PERRLA   Ears:    Ears appear intact with no abnormalities noted   Throat:   No oral lesions, no thrush, oral mucosa moist   Neck:   No adenopathy, supple, trachea midline, no thyromegaly, no   carotid bruit, no JVD   Back:     No kyphosis present, no scoliosis present, no skin lesions,      erythema or scars, no tenderness to percussion or                   palpation,   range of motion normal   Lungs:     Clear to auscultation,respirations regular, even and                  unlabored    Heart:    Regular rhythm and normal rate, normal S1 and S2, no            murmur, no gallop, no rub, no click   Chest Wall:    No abnormalities observed   Abdomen:     Normal bowel sounds, no masses, no organomegaly, soft        non-tender, non-distended, no guarding, no rebound                tenderness   Rectal:     Deferred   Extremities:   Tenderness over left  . Moves all extremities well, no edema,   no cyanosis, no redness   Pulses:   Pulses palpable and equal bilaterally   Skin:   No bleeding, bruising or rash   Lymph nodes:   No palpable adenopathy   Neurologic:   Cranial nerves 2 - 12 grossly intact, sensation intact, DTR       present and equal bilaterally           Diagnostic Tests:  [unfilled]      [unfilled]    Assessment:  There is no problem list on file for this patient.          Plan:  The patient voiced understanding of the risks, benefits, and alternative forms of treatment that were discussed and the patient consents to proceed with LT TIBIAL  CALCANEAL & MID FOOT OSTEOTOMIES W/ BONE GRAFT LT PERONEUS BREVIS LONGUS TENDON TRANSFER TENDO ACHILLES LENGTHENIN.       Discharge Plan: tomorrow to home      Date: 6/26/2017    JAKE Muir      Time:

## 2017-06-26 NOTE — PLAN OF CARE
Problem: Self-Care Deficit (Adult,Obstetrics,Pediatric)  Goal: Identify Related Risk Factors and Signs and Symptoms  Outcome: Outcome(s) achieved Date Met:  06/26/17 06/26/17 1305   Self-Care Deficit   Self-Care Deficit: Related Risk Factors surgery         Problem: Fall Risk (Adult)  Goal: Identify Related Risk Factors and Signs and Symptoms  Outcome: Outcome(s) achieved Date Met:  06/26/17 06/26/17 1305   Fall Risk   Fall Risk: Related Risk Factors environment unfamiliar;gait/mobility problems         Problem: Pain, Acute (Adult)  Goal: Identify Related Risk Factors and Signs and Symptoms  Outcome: Outcome(s) achieved Date Met:  06/26/17 06/26/17 1305   Pain, Acute   Related Risk Factors (Acute Pain) surgery

## 2017-06-26 NOTE — PLAN OF CARE
Problem: Perioperative Period (Adult)  Goal: Signs and Symptoms of Listed Potential Problems Will be Absent or Manageable (Perioperative Period)  Outcome: Ongoing (interventions implemented as appropriate)    06/26/17 1113   Perioperative Period   Problems Assessed (Perioperative Period) all   Problems Present (Perioperative Period) none

## 2017-06-26 NOTE — PLAN OF CARE
Problem: Patient Care Overview (Adult)  Goal: Plan of Care Review  Outcome: Ongoing (interventions implemented as appropriate)    06/26/17 1113   Coping/Psychosocial Response Interventions   Plan Of Care Reviewed With patient   Patient Care Overview   Progress improving   Outcome Evaluation   Outcome Summary/Follow up Plan Vital signs stable, taking po fluids       Goal: Adult Individualization and Mutuality  Outcome: Ongoing (interventions implemented as appropriate)  Goal: Discharge Needs Assessment  Outcome: Ongoing (interventions implemented as appropriate)

## 2017-06-27 VITALS
TEMPERATURE: 99.4 F | DIASTOLIC BLOOD PRESSURE: 77 MMHG | SYSTOLIC BLOOD PRESSURE: 111 MMHG | WEIGHT: 156 LBS | OXYGEN SATURATION: 96 % | HEART RATE: 77 BPM | BODY MASS INDEX: 25.07 KG/M2 | RESPIRATION RATE: 16 BRPM | HEIGHT: 66 IN

## 2017-06-27 PROCEDURE — G0378 HOSPITAL OBSERVATION PER HR: HCPCS

## 2017-06-27 PROCEDURE — 97110 THERAPEUTIC EXERCISES: CPT

## 2017-06-27 PROCEDURE — 25010000003 CEFAZOLIN IN DEXTROSE 2-4 GM/100ML-% SOLUTION: Performed by: NURSE PRACTITIONER

## 2017-06-27 PROCEDURE — 97161 PT EVAL LOW COMPLEX 20 MIN: CPT

## 2017-06-27 PROCEDURE — 25010000002 KETOROLAC TROMETHAMINE PER 15 MG: Performed by: ANESTHESIOLOGY

## 2017-06-27 RX ORDER — OXYCODONE HYDROCHLORIDE AND ACETAMINOPHEN 5; 325 MG/1; MG/1
1 TABLET ORAL ONCE AS NEEDED
Status: DISCONTINUED | OUTPATIENT
Start: 2017-06-27 | End: 2017-06-27 | Stop reason: HOSPADM

## 2017-06-27 RX ORDER — OXYCODONE HYDROCHLORIDE AND ACETAMINOPHEN 5; 325 MG/1; MG/1
1 TABLET ORAL EVERY 4 HOURS PRN
Refills: 0
Start: 2017-06-27 | End: 2018-03-27

## 2017-06-27 RX ORDER — PROMETHAZINE HYDROCHLORIDE 12.5 MG/1
12.5 TABLET ORAL ONCE AS NEEDED
Status: DISCONTINUED | OUTPATIENT
Start: 2017-06-27 | End: 2017-06-27 | Stop reason: HOSPADM

## 2017-06-27 RX ADMIN — ASPIRIN 325 MG: 325 TABLET, DELAYED RELEASE ORAL at 08:40

## 2017-06-27 RX ADMIN — KETOROLAC TROMETHAMINE 15 MG: 30 INJECTION, SOLUTION INTRAMUSCULAR at 08:41

## 2017-06-27 RX ADMIN — OXYCODONE HYDROCHLORIDE AND ACETAMINOPHEN 2 TABLET: 5; 325 TABLET ORAL at 05:39

## 2017-06-27 RX ADMIN — CEFAZOLIN SODIUM 2 G: 2 INJECTION, SOLUTION INTRAVENOUS at 00:00

## 2017-06-27 RX ADMIN — KETOROLAC TROMETHAMINE 15 MG: 30 INJECTION, SOLUTION INTRAMUSCULAR at 00:00

## 2017-06-27 RX ADMIN — OXYCODONE HYDROCHLORIDE AND ACETAMINOPHEN 2 TABLET: 5; 325 TABLET ORAL at 11:11

## 2017-06-27 NOTE — DISCHARGE SUMMARY
Date of Discharge:  6/27/2017    Discharge Diagnosis: left tibial, calcaneal and midfoot osteotmy with tendon reconstruction    DETAILS OF HOSPITAL STAY     Presenting Problem/History of Present Illness  Osteoarthritis of left foot [M19.072]      Hospital Course  Patient is a 62 y.o. female presented with increase in pain and deformity, underwent surgical reconstruction wit excellent result.      Procedures Performed  Procedure(s):  LT TIBIAL CALCANEAL & MID FOOT OSTEOTOMIES W/ BONE GRAFT LT PERONEUS BREVIS LONGUS TENDON TRANSFER TENDO ACHILLES LENGTHENING        Consults:   Consults     No orders found for last 30 day(s).            Condition on Discharge:  good          Discharge Disposition  Home or Self Care    Discharge Medications   Grazyna Fritz   Home Medication Instructions SIDDHARTHA:[id number]    Printed on:06/27/17 3830   Medication Information                      acetaminophen (TYLENOL) 500 MG tablet  Take 500 mg by mouth Every 6 (Six) Hours As Needed for Mild Pain (1-3).             amLODIPine (NORVASC) 5 MG tablet  Take 5 mg by mouth Every Evening.             aspirin  MG EC tablet  Take 1 tablet by mouth Daily.             escitalopram (LEXAPRO) 10 MG tablet  Take 10 mg by mouth Every Evening.             Loratadine 10 MG capsule  Take 10 mg by mouth Daily As Needed (ALLERIGES).             Mirabegron ER (MYRBETRIQ) 25 MG tablet sustained-release 24 hour 24 hr tablet  Take 25 mg by mouth Daily.             Multiple Vitamins-Minerals (MULTIVITAMIN ADULTS PO)  Take 1 tablet by mouth Daily.             olmesartan (BENICAR) 20 MG tablet  Take 20 mg by mouth Every Evening.             oxyCODONE-acetaminophen (PERCOCET) 5-325 MG per tablet  Take 1 tablet by mouth Every 4 (Four) Hours As Needed for Moderate Pain (4-6).                 Discharge Diet:   Diet Instructions     Advance Diet as Tolerated                     Activity at Discharge:   Activity Instructions     Discharge Activity       1) No  driving for 6 weeks  and no longer taking narcotics.   2)ice and elevate.loosen ace prn tightness  3)NWB LLE  4) F/u in 2 weeks with dr. Ferrari                 Follow-up Appointments  No future appointments.  Additional Instructions for the Follow-ups that You Need to Schedule     Return to Clinic for Follow Up (Specify Provider)    As directed    To:  in 2 weeks with Dr. Ferrari please call [phone] for an appointment   Follow Up:  2 Weeks   Follow Up Details:  call for apt                      Ok Ferrari MD  06/27/17  7:30 AM

## 2017-06-27 NOTE — THERAPY DISCHARGE NOTE
Acute Care - Physical Therapy Initial Eval/Discharge  [facility]     Patient Name: Grazyna Fritz  : 1955  MRN: [id number]  Today's Date: 2017   Onset of Illness/Injury or Date of Surgery Date: 17  Date of Referral to PT: 17  Referring Physician: Vega      Admit Date: 2017    Visit Dx:    ICD-10-CM ICD-9-CM   1. Difficulty walking R26.2 719.7     Patient Active Problem List   Diagnosis   • Osteoarthritis of left foot     Past Medical History:   Diagnosis Date   • Anxiety and depression    • Arthritis     OSTEO. HANDS   • Basal cell carcinoma of skin     REMOVED FROM FACE   • Elevated cholesterol    • Heartburn     ON OCC   • Hypertension    • Left foot pain    • Seasonal allergies    • Urge incontinence      Past Surgical History:   Procedure Laterality Date   • BUNIONECTOMY Right    • COLONOSCOPY     • LAPAROSCOPIC CHOLECYSTECTOMY     • SKIN CANCER EXCISION      FACE   • TUBAL ABDOMINAL LIGATION            PT ASSESSMENT (last 72 hours)      PT Evaluation       17 0957 17 0526    Rehab Evaluation    Document Type evaluation;discharge summary  -EJ     Subjective Information agree to therapy  -EJ     Patient Effort, Rehab Treatment good  -EJ     Symptoms Noted During/After Treatment none  -EJ     General Information    Onset of Illness/Injury or Date of Surgery Date 17  -EJ     Referring Physician Vega  -EJ     General Observations supine in bed  -EJ     Precautions/Limitations fall precautions;non-weight bearing status   NWB LLE  -EJ     Prior Level of Function independent:;community mobility;all household mobility;ADL's  -EJ     Equipment Currently Used at Home none  -EJ none  -JF    Plans/Goals Discussed With patient and family;agreed upon  -EJ     Barriers to Rehab none identified  -EJ     Living Environment    Lives With spouse  -EJ     Living Arrangements house  -EJ     Home Accessibility stairs to enter home  -EJ     Number of Stairs to Enter Home 4  -EJ      Transportation Available  car  -JF    Clinical Impression    Date of Referral to PT 06/27/17  -EJ     Patient/Family Goals Statement Pt plans to return home today  -EJ     Criteria for Skilled Therapeutic Interventions Met --   DC home  -EJ     Impairments Found (describe specific impairments) gait, locomotion, and balance  -EJ     Pain Assessment    Pain Assessment 0-10  -EJ     Pain Score 3  -EJ     Pain Location Ankle  -EJ     Pain Orientation Left  -EJ     Cognitive Assessment/Intervention    Current Cognitive/Communication Assessment functional  -EJ     Orientation Status oriented x 4  -EJ     Follows Commands/Answers Questions 100% of the time  -EJ     Personal Safety WNL/WFL  -EJ     Personal Safety Interventions gait belt;fall prevention program maintained;nonskid shoes/slippers when out of bed;supervised activity  -EJ     ROM (Range of Motion)    General ROM no range of motion deficits identified   x L ankle  -EJ     MMT (Manual Muscle Testing)    General MMT Assessment no strength deficits identified   x L ankle  -EJ     Mobility Assessment/Training    Extremity Weight-Bearing Status left lower extremity  -EJ     Left Lower Extremity Weight-Bearing non weight-bearing  -EJ     Bed Mobility, Assessment/Treatment    Bed Mob, Supine to Sit, [place] independent  -EJ     Bed Mob, Sit to Supine, [place] not tested  -EJ     Transfer Assessment/Treatment    Transfers, Sit-Stand [place] verbal cues required;stand by assist  -EJ     Transfers, Stand-Sit [place] stand by assist  -EJ     Transfers, Sit-Stand-Sit, Assist Device rolling walker  -EJ     Transfer, Maintain Weight Bearing Status able to maintain weight bearing status  -EJ     Transfer, Safety Issues step length decreased;sequencing ability decreased  -EJ     Transfer, Impairments strength decreased  -EJ     Gait Assessment/Treatment    Gait, [place] Level verbal cues required;stand by assist;contact guard assist;2 person assist  required  -EJ     Gait, Assistive Device rolling walker  -EJ     Gait, Distance (Feet) 20  -EJ     Gait, Gait Deviations antalgic;abeba decreased;step length decreased  -EJ     Gait, Maintain Weight Bearing Status able to maintain weight bearing status  -EJ     Gait, Comment NWB LLE  -EJ     Stairs Assessment/Treatment    Number of Stairs 4  -EJ     Stairs, Handrail Location right side (ascending)  -EJ     Stairs, [place] Level verbal cues required;nonverbal cues required (demo/gesture);moderate assist (50% patient effort);2 person assist required   pt used R handrail and  assisting on L side  -EJ     Stairs, Technique Used step to step (descending);step to step (ascending)  -EJ     Stairs, Maintain Weight Bearing Status able to maintain weight bearing status  -EJ     Stairs, Safety Issues sequencing ability decreased  -EJ     Stairs, Impairments strength decreased  -EJ     Stairs, Comment performed x 2  -EJ     Positioning and Restraints    Pre-Treatment Position in bed  -EJ     Post Treatment Position chair  -EJ     In Chair notified nsg;reclined;call light within reach;encouraged to call for assist;with family/caregiver  -       06/26/17 1220       General Information    Equipment Currently Used at Home none  -LP     Living Environment    Lives With spouse  -LP     Living Arrangements house  -LP     Home Accessibility stairs to enter home  -     Number of Stairs to Enter Home 4  -LP     Stair Railings at Home outside, present on left side  -LP     Type of Financial/Environmental Concern none  -LP     Transportation Available car  -LP       User Key  (r) = Recorded By, (t) = Taken By, (c) = Cosigned By    Initials Name Provider Type     Neeta Pereira RN Registered Nurse    BRITTANY Landin PT Physical Therapist    SAMMY Herring RN Registered Nurse              PT Recommendation and Plan  Anticipated Discharge Disposition: home with assist  PT Frequency: evaluation only  Plan of Care  Review  Plan Of Care Reviewed With: patient, spouse  Progress: improving  Outcome Summary/Follow up Plan: Pt to d/c home today. Performed stair training x 2. Pt and  demonstrate and verbalize understanding and also report that they will have assistance of neighbors if needed.               Outcome Measures       06/27/17 1000          How much help from another person do you currently need...    Turning from your back to your side while in flat bed without using bedrails? 4  -EJ      Moving from lying on back to sitting on the side of a flat bed without bedrails? 4  -EJ      Moving to and from a bed to a chair (including a wheelchair)? 3  -EJ      Standing up from a chair using your arms (e.g., wheelchair, bedside chair)? 3  -EJ      Climbing 3-5 steps with a railing? 3  -EJ      To walk in hospital room? 3  -EJ      AM-PAC 6 Clicks Score 20  -EJ      Functional Assessment    Outcome Measure Options AM-PAC 6 Clicks Basic Mobility (PT)  -EJ        User Key  (r) = Recorded By, (t) = Taken By, (c) = Cosigned By    Initials Name Provider Type    BRITTANY Landin PT Physical Therapist           Time Calculation:         PT Charges       06/27/17 1046          Time Calculation    Start Time 0957  -EJ      Stop Time 1040  -EJ      Time Calculation (min) 43 min  -EJ      PT Received On 06/27/17  -EJ        User Key  (r) = Recorded By, (t) = Taken By, (c) = Cosigned By    Initials Name Provider Type    BRITTANY Landin PT Physical Therapist          Therapy Charges for Today     Code Description Service Date Service Provider Modifiers Qty    32981160061 HC PT EVAL LOW COMPLEXITY 1 6/27/2017 Rohini Landin PT GP 1    56954978520 HC PT THER PROC EA 15 MIN 6/27/2017 Rohini Landin, PT GP 2    83621829952 HC PT CARE PLAN EACH 15 MIN 6/27/2017 Rohini Landin, PT GP 1    40338301165 HC PT THER SUPP EA 15 MIN 6/27/2017 Rohini Landin, PT GP 2          PT G-Codes  Outcome Measure Options: AM-PAC 6  Clicks Basic Mobility (PT)    PT Discharge Summary  Anticipated Discharge Disposition: home with assist  Reason for Discharge: Discharge from facility  Discharge Destination: Home with assist    Rohini Landin, PT  6/27/2017

## 2017-06-27 NOTE — CONSULTS
Postop for continuous peripheral nerve block catheter - sciatic.  Doing well; minimal pain. Cath intact.

## 2017-06-27 NOTE — PLAN OF CARE
Problem: Patient Care Overview (Adult)  Goal: Plan of Care Review  Outcome: Ongoing (interventions implemented as appropriate)    06/27/17 0526   Patient Care Overview   Progress progress toward functional goals as expected   Outcome Evaluation   Outcome Summary/Follow up Plan Pt is a post op of a left ankle. Pt continues to be non weight bearing to the left leg. Pt continues with the ON Q pain pump which provides good relief. Pt has been ambulating to the bathroom with an assist of 1. Pt educated on the importance of monitoring her blood pressure. Pt verbalized understanding. Pt is possibly going home in AM.       Goal: Adult Individualization and Mutuality  Outcome: Ongoing (interventions implemented as appropriate)    06/27/17 0526   Individualization   Patient Specific Preferences None mentioned   Patient Specific Goals Increase ambulation distance and continue with good pain control   Patient Specific Interventions Assist with ADLs as needed   Mutuality/Individual Preferences   What Anxieties, Fears or Concerns Do You Have About Your Health or Care? None   What Questions Do You Have About Your Health or Care? None   What Information Would Help Us Give You More Personalized Care? None       Goal: Discharge Needs Assessment  Outcome: Ongoing (interventions implemented as appropriate)    Problem: Perioperative Period (Adult)  Goal: Signs and Symptoms of Listed Potential Problems Will be Absent or Manageable (Perioperative Period)  Outcome: Ongoing (interventions implemented as appropriate)    Problem: Self-Care Deficit (Adult,Obstetrics,Pediatric)  Goal: Improved Ability to Perform BADL and IADL  Outcome: Ongoing (interventions implemented as appropriate)    Problem: Fall Risk (Adult)  Goal: Absence of Falls  Outcome: Ongoing (interventions implemented as appropriate)    Problem: Pain, Acute (Adult)  Goal: Acceptable Pain Control/Comfort Level  Outcome: Ongoing (interventions implemented as appropriate)

## 2017-06-27 NOTE — PLAN OF CARE
Problem: Patient Care Overview (Adult)  Goal: Plan of Care Review  Outcome: Outcome(s) achieved Date Met:  06/27/17 06/27/17 1044   Coping/Psychosocial Response Interventions   Plan Of Care Reviewed With patient;spouse   Patient Care Overview   Progress improving   Outcome Evaluation   Outcome Summary/Follow up Plan Pt to d/c home today. Performed stair training x 2. Pt and  demonstrate and verbalize understanding and also report that they will have assistance of neighbors if needed. No further concerns.

## 2017-06-27 NOTE — PROGRESS NOTES
Orthopedic Progress Note    Assessment/Plan     Status post-  Procedure(s):  LT TIBIAL CALCANEAL & MID FOOT OSTEOTOMIES W/ BONE GRAFT LT PERONEUS BREVIS LONGUS TENDON TRANSFER TENDO ACHILLES LENGTHENING     Pain Relief: complete resolution      Activity: up with assistance    Weight Bearing: NWB     LOS: 0 days     Subjective     Post-Operative Day: 1 post-left Procedure(s):  LT TIBIAL CALCANEAL & MID FOOT OSTEOTOMIES W/ BONE GRAFT LT PERONEUS BREVIS LONGUS TENDON TRANSFER TENDO ACHILLES LENGTHENING   Systemic or Specific Complaints: No Complaints    Objective     Vital signs in last 24 hours:  Temp:  [97.2 °F (36.2 °C)-99.2 °F (37.3 °C)] 97.2 °F (36.2 °C)  Heart Rate:  [] 81  Resp:  [16-21] 16  BP: (107-153)/(68-92) 107/70  Vitals:    06/26/17 1353 06/26/17 1900 06/26/17 2300 06/27/17 0300   BP: 123/78 133/80 109/68 107/70   BP Location: Left arm Left arm Left arm Left arm   Patient Position: Lying Lying Lying Lying   Pulse: 88 87 81 81   Resp: 16 16 16 16   Temp: Comment: PT DRINKING FLUIDS 98 °F (36.7 °C) 99.2 °F (37.3 °C) 97.2 °F (36.2 °C)   TempSrc:  Oral Oral Oral   SpO2: 98% 93% 96% 97%   Weight:       Height:           General: appears stated age and cooperative   Neurovascular: Toes warm to touch, df, pf of toes in tact.   Wound: No Drainage   Activity: Doing well appropriate for the post op day   DVT Exam: No evidence of DVT seen on physical exam.   Hemovac Drain:  n/a       Data Review:                Continues current post-op course  Discharge Plan Home on today    Ok Ferrari MD    Date: 6/27/2017  Time: 7:24 AM

## 2018-03-27 ENCOUNTER — OFFICE VISIT (OUTPATIENT)
Dept: SURGERY | Facility: CLINIC | Age: 63
End: 2018-03-27

## 2018-03-27 VITALS — BODY MASS INDEX: 24.33 KG/M2 | OXYGEN SATURATION: 97 % | WEIGHT: 151.4 LBS | HEART RATE: 100 BPM | HEIGHT: 66 IN

## 2018-03-27 DIAGNOSIS — L72.3 SEBACEOUS CYST: Primary | ICD-10-CM

## 2018-03-27 PROCEDURE — 88304 TISSUE EXAM BY PATHOLOGIST: CPT | Performed by: SURGERY

## 2018-03-27 PROCEDURE — 21930 EXC BACK LES SC < 3 CM: CPT | Performed by: SURGERY

## 2018-03-27 RX ORDER — ATORVASTATIN CALCIUM 20 MG/1
20 TABLET, FILM COATED ORAL NIGHTLY
COMMUNITY
Start: 2018-02-15 | End: 2022-05-23 | Stop reason: HOSPADM

## 2018-03-27 RX ORDER — OMEPRAZOLE 20 MG/1
20 CAPSULE, DELAYED RELEASE ORAL DAILY PRN
COMMUNITY

## 2018-03-27 RX ORDER — VITAMIN E 268 MG
400 CAPSULE ORAL DAILY
COMMUNITY
End: 2020-06-15

## 2018-03-27 NOTE — PROGRESS NOTES
General Surgery  Initial Office Visit    CC: Sebaceous cyst of lower back    HPI: The patient is a pleasant 62 y.o. year-old lady who presents today for evaluation of a sebaceous cyst on her left lower back that is been present for a couple of years.  She says that the cyst initially was large, but spontaneously evacuated a large amount of sebum and regressed.  The cyst in the last few months has been enlarging again and she wishes to have it removed.  She was referred to see me from her dermatologist, Dr. Berenice Tse.    Past Medical History: Hypertension, anxiety, basal cell carcinoma, hyperlipidemia    Past Surgical History: Cholecystectomy (2006, Dr. Javi Woo), colonoscopy (2006, Dr. Abrams), bunion surgery (2014), foot surgery (2017)    Medications:   Losartan 20  Grams daily  Amlodipine 5 g daily  Escitalopram 10 mg daily  Myrbetriq any 5 mg daily  Atorvastatin 20 mg daily  Loratadine 10 mg daily  Omeprazole 20 mg daily  Multivitamin once daily vitamin E 4000 units daily    Allergies: No known drug allergies    Family History: Father with lung cancer, sister with cervical cancer, mother with coronary artery disease    Social History: , works as an  for the [employer], nonsmoker, no alcohol use    ROS:   Constitutional: Negative for fevers or chills  HENT: Positive for sneezing; Negative for hearing loss or runny nose  Eyes: Negative for vision changes or scleral icterus  Respiratory: Negative for cough or shortness of breath  Cardiovascular: Negative for chest pain or heart palpitations  Gastrointestinal: Positive for reflux; Negative for abdominal pain, nausea, vomiting, constipation, melena, or hematochezia  Genitourinary: Positive for increased urinary frequency; Negative for hematuria or dysuria  Musculoskeletal: Negative for joint pain or back pain  Neurologic: Positive for headaches; Negative for tremors or dizziness  Psychiatric: Negative for anxiety or  depression  All other systems reviewed and negative    Physical Exam:  Vitals:    03/27/18 0809   Pulse: 100   SpO2: 97%     General: No acute distress, well-nourished & well-developed  HEAD: normocephalic, atraumatic  EYES: normal conjunctiva, sclera anicteric  EARS: grossly normal hearing  NECK: supple, no thyromegaly  CARDIOVASCULAR: regular rate and rhythm  RESPIRATORY: clear to auscultation bilaterally  GASTROINTESTINAL: soft, nontender, non-distended  MUSCULOSKELETAL: normal gait and station. No gross extremity abnormalities  PSYCHIATRIC: oriented x3, normal mood and affect  BACK: 2.0 cm sebaceous cyst of the left lower back with no overlying skin erythema and no fluctuance, wide central pore within the overlying skin    Procedure Note:  Pre-Operative Diagnosis: Sebaceous cyst of the back  Post-Operative Diagnosis: Same  Procedure performed: Excision of 2.0 cm sebaceous cyst of the left midback  Surgeon: Berkley Herring MD  Assistant: None  Anesthesia: Local (2% Lidocaine)  Complications: None  EBL: Minimal  Specimens: Back cyst  Description of Procedure: The patient was brought to the minor procedure room and sonia prone on the examining table.  Her back was prepped and draped sterilely using Hibiclens soap and a surgical timeout completed.  The sebaceous cyst over the back was anesthetized using 2% lidocaine.  The cyst was excised using an elliptical skin incision, carrying the incision down to the deeper subcutaneous tissues.  The cyst was completely excised and passed off to pathology in formalin.  The remaining subcutaneous wound was inspected and hemostasis achieved using the Hyfrecator.  The incision was closed primarily using 3-0 Vicryl deep dermal sutures and 3-0 nylon interrupted vertical mattress sutures.  The patient was instructed to return in 2 weeks for suture removal.      ASSESSMENT & PLAN  Mrs. Fritz is a 62-year-old lady with a sebaceous cyst of her left midback that I excised today  under local anesthesia.  She tolerated the procedure quite well and should return in 2 weeks for suture removal.    Berkley Herring MD  General and Endoscopic Surgery  [facility]    [address], [address]  [place], [place], [address]  P: [phone]  F: [phone]

## 2018-03-29 LAB
LAB AP CASE REPORT: NORMAL
LAB AP CLINICAL INFORMATION: NORMAL
Lab: NORMAL
PATH REPORT.FINAL DX SPEC: NORMAL
PATH REPORT.GROSS SPEC: NORMAL

## 2018-04-10 ENCOUNTER — OFFICE VISIT (OUTPATIENT)
Dept: SURGERY | Facility: CLINIC | Age: 63
End: 2018-04-10

## 2018-04-10 DIAGNOSIS — L72.3 SEBACEOUS CYST: Primary | ICD-10-CM

## 2018-04-10 PROCEDURE — 99024 POSTOP FOLLOW-UP VISIT: CPT | Performed by: SURGERY

## 2018-04-10 NOTE — PROGRESS NOTES
CHIEF COMPLAINT:   Chief Complaint   Patient presents with   • Post-op     PO Excision of 2.0 cm sebaceous cyst of the left midback 3/27/18       HISTORY OF PRESENT ILLNESS:  This is a 62 y.o. female who presents for a post-operative visit after undergoing excision of a sebaceous cyst of the back on 3/27/2018. The incision has healed well with no drainage or erythema.    Pathology:   SKIN AND SUBCUTANEOUS TISSUE, BACK, EXCISION:                EPIDERMAL INCLUSION CYST.    PHYSICAL EXAM:  Lungs: Clear  Heart: RRR  BACK: Incision ishealing well without any erythema or signs of infection. Nylon sutures intact.  Ext: no significant edema, calves nontender    A/P:  This is a 62 y.o. female patient who is S/P excision of a sebaceous cyst of the back on 3/27/2018.    I removed her nylon sutures today and applied Steri-Strips.  She can follow-up with me as needed.  I discussed her benign pathology findings.    Berkley Herring MD  General and Endoscopic Surgery  [facility]    [address], [address]  [place], [place], [address]  P: [phone]  F: [phone]

## 2019-10-14 ENCOUNTER — PREP FOR SURGERY (OUTPATIENT)
Dept: OTHER | Facility: HOSPITAL | Age: 64
End: 2019-10-14

## 2019-10-14 DIAGNOSIS — Z12.11 SCREENING FOR COLON CANCER: Primary | ICD-10-CM

## 2019-10-17 PROBLEM — Z12.11 SCREENING FOR COLON CANCER: Status: ACTIVE | Noted: 2019-10-17

## 2019-11-21 ENCOUNTER — ANESTHESIA (OUTPATIENT)
Dept: GASTROENTEROLOGY | Facility: HOSPITAL | Age: 64
End: 2019-11-21

## 2019-11-21 ENCOUNTER — HOSPITAL ENCOUNTER (OUTPATIENT)
Facility: HOSPITAL | Age: 64
Setting detail: HOSPITAL OUTPATIENT SURGERY
Discharge: HOME | End: 2019-11-21
Attending: SURGERY | Admitting: SURGERY

## 2019-11-21 ENCOUNTER — ANESTHESIA EVENT (OUTPATIENT)
Dept: GASTROENTEROLOGY | Facility: HOSPITAL | Age: 64
End: 2019-11-21

## 2019-11-21 VITALS
DIASTOLIC BLOOD PRESSURE: 79 MMHG | RESPIRATION RATE: 16 BRPM | BODY MASS INDEX: 22.98 KG/M2 | WEIGHT: 143 LBS | SYSTOLIC BLOOD PRESSURE: 139 MMHG | HEIGHT: 66 IN | HEART RATE: 69 BPM | TEMPERATURE: 98.5 F | OXYGEN SATURATION: 100 %

## 2019-11-21 PROCEDURE — S0260 H&P FOR SURGERY: HCPCS | Performed by: SURGERY

## 2019-11-21 PROCEDURE — 25010000002 PROPOFOL 10 MG/ML EMULSION: Performed by: NURSE ANESTHETIST, CERTIFIED REGISTERED

## 2019-11-21 PROCEDURE — G0121 COLON CA SCRN NOT HI RSK IND: HCPCS | Performed by: SURGERY

## 2019-11-21 RX ORDER — SODIUM CHLORIDE 0.9 % (FLUSH) 0.9 %
3 SYRINGE (ML) INJECTION EVERY 12 HOURS SCHEDULED
Status: DISCONTINUED | OUTPATIENT
Start: 2019-11-21 | End: 2019-11-21 | Stop reason: HOSPADM

## 2019-11-21 RX ORDER — PROPOFOL 10 MG/ML
VIAL (ML) INTRAVENOUS AS NEEDED
Status: DISCONTINUED | OUTPATIENT
Start: 2019-11-21 | End: 2019-11-21 | Stop reason: SURG

## 2019-11-21 RX ORDER — SODIUM CHLORIDE 0.9 % (FLUSH) 0.9 %
10 SYRINGE (ML) INJECTION AS NEEDED
Status: DISCONTINUED | OUTPATIENT
Start: 2019-11-21 | End: 2019-11-21 | Stop reason: HOSPADM

## 2019-11-21 RX ORDER — SODIUM CHLORIDE, SODIUM LACTATE, POTASSIUM CHLORIDE, CALCIUM CHLORIDE 600; 310; 30; 20 MG/100ML; MG/100ML; MG/100ML; MG/100ML
30 INJECTION, SOLUTION INTRAVENOUS CONTINUOUS PRN
Status: DISCONTINUED | OUTPATIENT
Start: 2019-11-21 | End: 2019-11-21 | Stop reason: HOSPADM

## 2019-11-21 RX ORDER — PROPOFOL 10 MG/ML
VIAL (ML) INTRAVENOUS CONTINUOUS PRN
Status: DISCONTINUED | OUTPATIENT
Start: 2019-11-21 | End: 2019-11-21 | Stop reason: SURG

## 2019-11-21 RX ORDER — LORAZEPAM 0.5 MG/1
.25-.5 TABLET ORAL EVERY 8 HOURS PRN
COMMUNITY

## 2019-11-21 RX ORDER — LIDOCAINE HYDROCHLORIDE 20 MG/ML
INJECTION, SOLUTION INFILTRATION; PERINEURAL AS NEEDED
Status: DISCONTINUED | OUTPATIENT
Start: 2019-11-21 | End: 2019-11-21 | Stop reason: SURG

## 2019-11-21 RX ADMIN — SODIUM CHLORIDE, POTASSIUM CHLORIDE, SODIUM LACTATE AND CALCIUM CHLORIDE 30 ML/HR: 600; 310; 30; 20 INJECTION, SOLUTION INTRAVENOUS at 08:54

## 2019-11-21 RX ADMIN — PROPOFOL 250 MCG/KG/MIN: 10 INJECTION, EMULSION INTRAVENOUS at 09:26

## 2019-11-21 RX ADMIN — LIDOCAINE HYDROCHLORIDE 60 MG: 20 INJECTION, SOLUTION INFILTRATION; PERINEURAL at 09:26

## 2019-11-21 RX ADMIN — PROPOFOL 80 MG: 10 INJECTION, EMULSION INTRAVENOUS at 09:26

## 2019-11-21 NOTE — ANESTHESIA PREPROCEDURE EVALUATION
Anesthesia Evaluation     Patient summary reviewed and Nursing notes reviewed   no history of anesthetic complications:  NPO Solid Status: > 8 hours  NPO Liquid Status: > 2 hours           Airway   Mallampati: II  Dental      Pulmonary - negative pulmonary ROS and normal exam   Cardiovascular - normal exam    (+) hypertension less than 2 medications, hyperlipidemia,       Neuro/Psych  (+) psychiatric history Anxiety and Depression,     GI/Hepatic/Renal/Endo      Musculoskeletal     Abdominal    Substance History      OB/GYN          Other   arthritis,                      Anesthesia Plan    ASA 2     MAC     intravenous induction     Anesthetic plan, all risks, benefits, and alternatives have been provided, discussed and informed consent has been obtained with: patient.

## 2019-11-21 NOTE — ANESTHESIA POSTPROCEDURE EVALUATION
"Patient: Grazyna Fritz    Procedure Summary     Date:  11/21/19 Room / Location:  [facility] ENDOSCOPY 6 /  [facility]    Anesthesia Start:  0915 Anesthesia Stop:  0950    Procedure:  COLONOSCOPY (N/A ) Diagnosis:       Screening for colon cancer      (Screening for colon cancer [Z12.11])    Surgeon:  Berkley Herring MD Provider:  Alexi Murillo MD    Anesthesia Type:  MAC ASA Status:  2          Anesthesia Type: MAC  Last vitals  BP   113/69 (11/21/19 0950)   Temp   36.9 °C (98.5 °F) (11/21/19 0828)   Pulse   80 (11/21/19 0950)   Resp   16 (11/21/19 0950)     SpO2   100 % (11/21/19 0950)     Post Anesthesia Care and Evaluation    Patient location during evaluation: bedside  Patient participation: complete - patient participated  Level of consciousness: awake and alert  Pain management: adequate  Airway patency: patent  Anesthetic complications: No anesthetic complications    Cardiovascular status: acceptable  Respiratory status: acceptable  Hydration status: acceptable    Comments: /69 (BP Location: Left arm, Patient Position: Lying)   Pulse 80   Temp 36.9 °C (98.5 °F) (Oral)   Resp 16   Ht 167.6 cm (66\")   Wt 64.9 kg (143 lb)   SpO2 100%   BMI 23.08 kg/m²       "

## 2020-06-11 ENCOUNTER — TRANSCRIBE ORDERS (OUTPATIENT)
Dept: SLEEP MEDICINE | Facility: HOSPITAL | Age: 65
End: 2020-06-11

## 2020-06-11 DIAGNOSIS — Z01.818 OTHER SPECIFIED PRE-OPERATIVE EXAMINATION: Primary | ICD-10-CM

## 2020-06-13 ENCOUNTER — LAB (OUTPATIENT)
Dept: LAB | Facility: HOSPITAL | Age: 65
End: 2020-06-13

## 2020-06-13 DIAGNOSIS — Z01.818 OTHER SPECIFIED PRE-OPERATIVE EXAMINATION: ICD-10-CM

## 2020-06-13 PROCEDURE — U0004 COV-19 TEST NON-CDC HGH THRU: HCPCS

## 2020-06-15 ENCOUNTER — APPOINTMENT (OUTPATIENT)
Dept: PREADMISSION TESTING | Facility: HOSPITAL | Age: 65
End: 2020-06-15

## 2020-06-15 VITALS
RESPIRATION RATE: 16 BRPM | BODY MASS INDEX: 22.79 KG/M2 | OXYGEN SATURATION: 99 % | HEIGHT: 66 IN | TEMPERATURE: 97.9 F | DIASTOLIC BLOOD PRESSURE: 88 MMHG | SYSTOLIC BLOOD PRESSURE: 137 MMHG | HEART RATE: 98 BPM | WEIGHT: 141.8 LBS

## 2020-06-15 LAB
ALBUMIN SERPL-MCNC: 4.7 G/DL (ref 3.5–5.2)
ALBUMIN/GLOB SERPL: 2.4 G/DL
ALP SERPL-CCNC: 45 U/L (ref 39–117)
ALT SERPL W P-5'-P-CCNC: 21 U/L (ref 1–33)
ANION GAP SERPL CALCULATED.3IONS-SCNC: 11.9 MMOL/L (ref 5–15)
AST SERPL-CCNC: 21 U/L (ref 1–32)
BASOPHILS # BLD AUTO: 0.03 10*3/MM3 (ref 0–0.2)
BASOPHILS NFR BLD AUTO: 0.7 % (ref 0–1.5)
BILIRUB SERPL-MCNC: 0.3 MG/DL (ref 0.2–1.2)
BUN BLD-MCNC: 12 MG/DL (ref 8–23)
BUN/CREAT SERPL: 17.9 (ref 7–25)
CALCIUM SPEC-SCNC: 8.9 MG/DL (ref 8.6–10.5)
CHLORIDE SERPL-SCNC: 102 MMOL/L (ref 98–107)
CO2 SERPL-SCNC: 27.1 MMOL/L (ref 22–29)
CREAT BLD-MCNC: 0.67 MG/DL (ref 0.57–1)
DEPRECATED RDW RBC AUTO: 46.3 FL (ref 37–54)
EOSINOPHIL # BLD AUTO: 0.04 10*3/MM3 (ref 0–0.4)
EOSINOPHIL NFR BLD AUTO: 0.9 % (ref 0.3–6.2)
ERYTHROCYTE [DISTWIDTH] IN BLOOD BY AUTOMATED COUNT: 12.6 % (ref 12.3–15.4)
GFR SERPL CREATININE-BSD FRML MDRD: 88 ML/MIN/1.73
GLOBULIN UR ELPH-MCNC: 2 GM/DL
GLUCOSE BLD-MCNC: 134 MG/DL (ref 65–99)
HCT VFR BLD AUTO: 38.7 % (ref 34–46.6)
HGB BLD-MCNC: 13 G/DL (ref 12–15.9)
LYMPHOCYTES # BLD AUTO: 0.74 10*3/MM3 (ref 0.7–3.1)
LYMPHOCYTES NFR BLD AUTO: 17 % (ref 19.6–45.3)
MCH RBC QN AUTO: 33.5 PG (ref 26.6–33)
MCHC RBC AUTO-ENTMCNC: 33.6 G/DL (ref 31.5–35.7)
MCV RBC AUTO: 99.7 FL (ref 79–97)
MONOCYTES # BLD AUTO: 0.36 10*3/MM3 (ref 0.1–0.9)
MONOCYTES NFR BLD AUTO: 8.3 % (ref 5–12)
NEUTROPHILS # BLD AUTO: 3.18 10*3/MM3 (ref 1.7–7)
NEUTROPHILS NFR BLD AUTO: 72.9 % (ref 42.7–76)
PLATELET # BLD AUTO: 149 10*3/MM3 (ref 140–450)
PMV BLD AUTO: 11.9 FL (ref 6–12)
POTASSIUM BLD-SCNC: 3.6 MMOL/L (ref 3.5–5.2)
PROT SERPL-MCNC: 6.7 G/DL (ref 6–8.5)
RBC # BLD AUTO: 3.88 10*6/MM3 (ref 3.77–5.28)
REF LAB TEST METHOD: NORMAL
SARS-COV-2 RNA RESP QL NAA+PROBE: NOT DETECTED
SODIUM BLD-SCNC: 141 MMOL/L (ref 136–145)
WBC NRBC COR # BLD: 4.36 10*3/MM3 (ref 3.4–10.8)

## 2020-06-15 PROCEDURE — 85025 COMPLETE CBC W/AUTO DIFF WBC: CPT | Performed by: ORTHOPAEDIC SURGERY

## 2020-06-15 PROCEDURE — 93010 ELECTROCARDIOGRAM REPORT: CPT | Performed by: INTERNAL MEDICINE

## 2020-06-15 PROCEDURE — 80053 COMPREHEN METABOLIC PANEL: CPT | Performed by: ORTHOPAEDIC SURGERY

## 2020-06-15 PROCEDURE — 93005 ELECTROCARDIOGRAM TRACING: CPT

## 2020-06-15 RX ORDER — IBUPROFEN 200 MG
200-400 TABLET ORAL EVERY 6 HOURS PRN
COMMUNITY
End: 2022-05-23 | Stop reason: HOSPADM

## 2020-06-15 NOTE — DISCHARGE INSTRUCTIONS
Take the following medications the morning of surgery: LORAZEPAM AS NEEDED        General Instructions:  • Do not eat solid food after midnight the night before surgery.  • You may drink clear liquids day of surgery but must stop at least one hour before your hospital arrival time.  • It is beneficial for you to have a clear drink that contains carbohydrates the day of surgery.  We suggest a 12 to 20 ounce bottle of Gatorade or Powerade for non-diabetic patients or a 12 to 20 ounce bottle of G2 or Powerade Zero for diabetic patients.    Clear liquids are liquids you can see through.  Nothing red in color.     Plain water                               Sports drinks  Sodas                                   Gelatin (Jell-O)  Fruit juices without pulp such as white grape juice and apple juice  Popsicles that contain no fruit or yogurt  Tea or coffee (no cream or milk added)  Gatorade / Powerade  G2 / Powerade Zero         • Bring any papers given to you in the doctor’s office.  • Wear clean comfortable clothes.  • Do not wear contact lenses, false eyelashes or make-up.  Bring a case for your glasses.   • Bring crutches or walker if applicable.  • Remove all piercings.  Leave jewelry and any other valuables at home.  • The Pre-Admission Testing nurse will instruct you to bring medications if unable to obtain an accurate list in Pre-Admission Testing.            Preventing a Surgical Site Infection:  • For 2 to 3 days before surgery, avoid shaving with a razor because the razor can irritate skin and make it easier to develop an infection.    • Any areas of open skin can increase the risk of a post-operative wound infection by allowing bacteria to enter and travel throughout the body.  Notify your surgeon if you have any skin wounds / rashes even if it is not near the expected surgical site.  The area will need assessed to determine if surgery should be delayed until it is healed.  • The night prior to surgery shower using  a fresh bar of anti-bacterial soap (such as Dial) and clean washcloth.  Sleep in a clean bed with clean clothing.  Do not allow pets to sleep with you.  • Shower on the morning of surgery using a fresh bar of anti-bacterial soap (such as Dial) and clean washcloth.  Dry with a clean towel and dress in clean clothing.  • Ask your surgeon if you will be receiving antibiotics prior to surgery.  • Make sure you, your family, and all healthcare providers clean their hands with soap and water or an alcohol based hand  before caring for you or your wound.    Day of surgery: 6/16/2020. [facility] ARRIVAL TIME 0615 PER SURGERY ARIK  Your arrival time is approximately two hours before your scheduled surgery time.  Upon arrival, a Pre-op nurse and Anesthesiologist will review your health history, obtain vital signs, and answer questions you may have.  The only belongings needed at this time will be a list of your home medications and if applicable your C-PAP/BI-PAP machine.  If you are staying overnight your family can leave the rest of your belongings in the car and bring them to your room later.  A Pre-op nurse will start an IV and you may receive medication in preparation for surgery, including something to help you relax.  Your family will be able to see you in the Pre-op area.  Two visitors at a time will be allowed in the Pre-op room.  While you are in surgery your family should notify the waiting room  if they leave the waiting room area and provide a contact phone number.    Please be aware that surgery does come with discomfort.  We want to make every effort to control your discomfort so please discuss any uncontrolled symptoms with your nurse.   Your doctor will most likely have prescribed pain medications.      If you are going home after surgery you will receive individualized written care instructions before being discharged.  A responsible adult must drive you to and from the hospital on the day of  your surgery and stay with you for 24 hours.        If you have any questions please call Pre-Admission Testing at [phone].  Deductibles and co-payments are collected on the day of service. Please be prepared to pay the required co-pay, deductible or deposit on the day of service as defined by your plan.    Patient Education for Self-Quarantine Process    Following your COVID testing, we strongly recommend that you do not leave your home after you have been tested for COVID except to get medical care. This includes not going to work, school or to public areas.  If this is not possible for you to do please limit your activities to only required outings.  Be sure to wear a mask when you are with other people, practice social distancing and wash your hands frequently.      The following items provide additional details to keep you safe.  • Wash your hands with soap and water frequently for at least 20 seconds.   • Avoid touching your eyes, nose and mouth with unwashed hands.  • Do not share anything - utensils, towels, food from the same bowl.   • Have your own utensils, drinking glass, dishes, towels and bedding.   • Do not have visitors.   • Do use FaceTime to stay in touch with family and friends.  • You should stay in a specific room away from others if possible.   • Stay at least 6 feet away from others in the home if you cannot have a dedicated room to yourself.   • Do not snuggle with your pet. While the CDC says there is no evidence that pets can spread COVID-19 or be infected from humans, it is probably best to avoid “petting, snuggling, being kissed or licked and sharing food (during self-quarantine)”, according to the CDC.   • Sanitize household surfaces daily. Include all high touch areas (door handles, light switches, phones, countertops, etc.)  • Do not share a bathroom with others, if possible.   • Wear a mask around others in your home if you are unable to stay in a separate room or 6 feet apart.  If  you are unable to wear a mask, have your family member wear a mask if they must be within 6 feet of you.   Call your surgeon immediately if you experience any of the following symptoms:  • Sore Throat  • Shortness of Breath or difficulty breathing  • Cough  • Chills  • Body soreness or muscle pain  • Headache  • Fever  • New loss of taste or smell  • Do not arrive for your surgery ill.  Your procedure will need to be rescheduled to another time.  You will need to call your physician before the day of surgery to avoid any unnecessary exposure to hospital staff as well as other patients.

## 2020-06-16 ENCOUNTER — HOSPITAL ENCOUNTER (OUTPATIENT)
Facility: HOSPITAL | Age: 65
Setting detail: HOSPITAL OUTPATIENT SURGERY
Discharge: HOME | End: 2020-06-16
Attending: ORTHOPAEDIC SURGERY | Admitting: ORTHOPAEDIC SURGERY

## 2020-06-16 ENCOUNTER — APPOINTMENT (OUTPATIENT)
Dept: GENERAL RADIOLOGY | Facility: HOSPITAL | Age: 65
End: 2020-06-16

## 2020-06-16 ENCOUNTER — ANESTHESIA EVENT (OUTPATIENT)
Dept: PERIOP | Facility: HOSPITAL | Age: 65
End: 2020-06-16

## 2020-06-16 ENCOUNTER — ANESTHESIA (OUTPATIENT)
Dept: PERIOP | Facility: HOSPITAL | Age: 65
End: 2020-06-16

## 2020-06-16 VITALS
HEART RATE: 90 BPM | RESPIRATION RATE: 18 BRPM | TEMPERATURE: 97.7 F | SYSTOLIC BLOOD PRESSURE: 128 MMHG | DIASTOLIC BLOOD PRESSURE: 75 MMHG | OXYGEN SATURATION: 95 %

## 2020-06-16 PROCEDURE — 25010000002 HYDROMORPHONE PER 4 MG: Performed by: ANESTHESIOLOGY

## 2020-06-16 PROCEDURE — C1713 ANCHOR/SCREW BN/BN,TIS/BN: HCPCS | Performed by: ORTHOPAEDIC SURGERY

## 2020-06-16 PROCEDURE — 25010000002 DEXAMETHASONE PER 1 MG: Performed by: NURSE ANESTHETIST, CERTIFIED REGISTERED

## 2020-06-16 PROCEDURE — 76000 FLUOROSCOPY <1 HR PHYS/QHP: CPT

## 2020-06-16 PROCEDURE — 25010000002 PROPOFOL 10 MG/ML EMULSION: Performed by: NURSE ANESTHETIST, CERTIFIED REGISTERED

## 2020-06-16 PROCEDURE — 25010000002 FENTANYL CITRATE (PF) 100 MCG/2ML SOLUTION: Performed by: NURSE ANESTHETIST, CERTIFIED REGISTERED

## 2020-06-16 PROCEDURE — 73560 X-RAY EXAM OF KNEE 1 OR 2: CPT

## 2020-06-16 PROCEDURE — 25010000002 ONDANSETRON PER 1 MG: Performed by: NURSE ANESTHETIST, CERTIFIED REGISTERED

## 2020-06-16 PROCEDURE — 25010000003 CEFAZOLIN IN DEXTROSE 2-4 GM/100ML-% SOLUTION: Performed by: ORTHOPAEDIC SURGERY

## 2020-06-16 PROCEDURE — 25010000002 EPINEPHRINE PER 0.1 MG: Performed by: ORTHOPAEDIC SURGERY

## 2020-06-16 PROCEDURE — 63710000001 PROMETHAZINE PER 25 MG: Performed by: ANESTHESIOLOGY

## 2020-06-16 DEVICE — CMT BONE 5CC: Type: IMPLANTABLE DEVICE | Site: KNEE | Status: FUNCTIONAL

## 2020-06-16 RX ORDER — HYDROCODONE BITARTRATE AND ACETAMINOPHEN 7.5; 325 MG/1; MG/1
1 TABLET ORAL ONCE AS NEEDED
Status: DISCONTINUED | OUTPATIENT
Start: 2020-06-16 | End: 2020-06-16 | Stop reason: HOSPADM

## 2020-06-16 RX ORDER — SODIUM CHLORIDE, SODIUM LACTATE, POTASSIUM CHLORIDE, AND CALCIUM CHLORIDE .6; .31; .03; .02 G/100ML; G/100ML; G/100ML; G/100ML
IRRIGANT IRRIGATION AS NEEDED
Status: DISCONTINUED | OUTPATIENT
Start: 2020-06-16 | End: 2020-06-16 | Stop reason: HOSPADM

## 2020-06-16 RX ORDER — FAMOTIDINE 10 MG/ML
20 INJECTION, SOLUTION INTRAVENOUS ONCE
Status: COMPLETED | OUTPATIENT
Start: 2020-06-16 | End: 2020-06-16

## 2020-06-16 RX ORDER — PROMETHAZINE HYDROCHLORIDE 25 MG/1
25 TABLET ORAL ONCE AS NEEDED
Status: COMPLETED | OUTPATIENT
Start: 2020-06-16 | End: 2020-06-16

## 2020-06-16 RX ORDER — FENTANYL CITRATE 50 UG/ML
50 INJECTION, SOLUTION INTRAMUSCULAR; INTRAVENOUS
Status: DISCONTINUED | OUTPATIENT
Start: 2020-06-16 | End: 2020-06-16 | Stop reason: HOSPADM

## 2020-06-16 RX ORDER — ONDANSETRON 2 MG/ML
INJECTION INTRAMUSCULAR; INTRAVENOUS AS NEEDED
Status: DISCONTINUED | OUTPATIENT
Start: 2020-06-16 | End: 2020-06-16 | Stop reason: SURG

## 2020-06-16 RX ORDER — FLUMAZENIL 0.1 MG/ML
0.2 INJECTION INTRAVENOUS AS NEEDED
Status: DISCONTINUED | OUTPATIENT
Start: 2020-06-16 | End: 2020-06-16 | Stop reason: HOSPADM

## 2020-06-16 RX ORDER — ONDANSETRON 2 MG/ML
4 INJECTION INTRAMUSCULAR; INTRAVENOUS ONCE AS NEEDED
Status: DISCONTINUED | OUTPATIENT
Start: 2020-06-16 | End: 2020-06-16 | Stop reason: HOSPADM

## 2020-06-16 RX ORDER — SODIUM CHLORIDE 0.9 % (FLUSH) 0.9 %
3-10 SYRINGE (ML) INJECTION AS NEEDED
Status: DISCONTINUED | OUTPATIENT
Start: 2020-06-16 | End: 2020-06-16 | Stop reason: HOSPADM

## 2020-06-16 RX ORDER — OXYCODONE AND ACETAMINOPHEN 7.5; 325 MG/1; MG/1
1 TABLET ORAL ONCE AS NEEDED
Status: COMPLETED | OUTPATIENT
Start: 2020-06-16 | End: 2020-06-16

## 2020-06-16 RX ORDER — LIDOCAINE HYDROCHLORIDE 20 MG/ML
INJECTION, SOLUTION INFILTRATION; PERINEURAL AS NEEDED
Status: DISCONTINUED | OUTPATIENT
Start: 2020-06-16 | End: 2020-06-16 | Stop reason: SURG

## 2020-06-16 RX ORDER — EPHEDRINE SULFATE 50 MG/ML
INJECTION, SOLUTION INTRAVENOUS AS NEEDED
Status: DISCONTINUED | OUTPATIENT
Start: 2020-06-16 | End: 2020-06-16 | Stop reason: SURG

## 2020-06-16 RX ORDER — PROMETHAZINE HYDROCHLORIDE 25 MG/ML
6.25 INJECTION, SOLUTION INTRAMUSCULAR; INTRAVENOUS ONCE AS NEEDED
Status: COMPLETED | OUTPATIENT
Start: 2020-06-16 | End: 2020-06-16

## 2020-06-16 RX ORDER — BUPIVACAINE HYDROCHLORIDE AND EPINEPHRINE 5; 5 MG/ML; UG/ML
INJECTION, SOLUTION PERINEURAL AS NEEDED
Status: DISCONTINUED | OUTPATIENT
Start: 2020-06-16 | End: 2020-06-16 | Stop reason: HOSPADM

## 2020-06-16 RX ORDER — HYDROMORPHONE HYDROCHLORIDE 1 MG/ML
0.5 INJECTION, SOLUTION INTRAMUSCULAR; INTRAVENOUS; SUBCUTANEOUS
Status: DISCONTINUED | OUTPATIENT
Start: 2020-06-16 | End: 2020-06-16 | Stop reason: HOSPADM

## 2020-06-16 RX ORDER — LIDOCAINE HYDROCHLORIDE 10 MG/ML
0.5 INJECTION, SOLUTION EPIDURAL; INFILTRATION; INTRACAUDAL; PERINEURAL ONCE AS NEEDED
Status: DISCONTINUED | OUTPATIENT
Start: 2020-06-16 | End: 2020-06-16 | Stop reason: HOSPADM

## 2020-06-16 RX ORDER — MIDAZOLAM HYDROCHLORIDE 1 MG/ML
2 INJECTION INTRAMUSCULAR; INTRAVENOUS
Status: DISCONTINUED | OUTPATIENT
Start: 2020-06-16 | End: 2020-06-16 | Stop reason: HOSPADM

## 2020-06-16 RX ORDER — SODIUM CHLORIDE, SODIUM LACTATE, POTASSIUM CHLORIDE, CALCIUM CHLORIDE 600; 310; 30; 20 MG/100ML; MG/100ML; MG/100ML; MG/100ML
9 INJECTION, SOLUTION INTRAVENOUS CONTINUOUS
Status: DISCONTINUED | OUTPATIENT
Start: 2020-06-16 | End: 2020-06-16 | Stop reason: HOSPADM

## 2020-06-16 RX ORDER — SODIUM CHLORIDE 0.9 % (FLUSH) 0.9 %
3 SYRINGE (ML) INJECTION EVERY 12 HOURS SCHEDULED
Status: DISCONTINUED | OUTPATIENT
Start: 2020-06-16 | End: 2020-06-16 | Stop reason: HOSPADM

## 2020-06-16 RX ORDER — DEXAMETHASONE SODIUM PHOSPHATE 10 MG/ML
INJECTION INTRAMUSCULAR; INTRAVENOUS AS NEEDED
Status: DISCONTINUED | OUTPATIENT
Start: 2020-06-16 | End: 2020-06-16 | Stop reason: SURG

## 2020-06-16 RX ORDER — FENTANYL CITRATE 50 UG/ML
INJECTION, SOLUTION INTRAMUSCULAR; INTRAVENOUS AS NEEDED
Status: DISCONTINUED | OUTPATIENT
Start: 2020-06-16 | End: 2020-06-16 | Stop reason: SURG

## 2020-06-16 RX ORDER — PROPOFOL 10 MG/ML
VIAL (ML) INTRAVENOUS AS NEEDED
Status: DISCONTINUED | OUTPATIENT
Start: 2020-06-16 | End: 2020-06-16 | Stop reason: SURG

## 2020-06-16 RX ORDER — HYDRALAZINE HYDROCHLORIDE 20 MG/ML
5 INJECTION INTRAMUSCULAR; INTRAVENOUS
Status: DISCONTINUED | OUTPATIENT
Start: 2020-06-16 | End: 2020-06-16 | Stop reason: HOSPADM

## 2020-06-16 RX ORDER — FENTANYL CITRATE 50 UG/ML
100 INJECTION, SOLUTION INTRAMUSCULAR; INTRAVENOUS
Status: DISCONTINUED | OUTPATIENT
Start: 2020-06-16 | End: 2020-06-16 | Stop reason: HOSPADM

## 2020-06-16 RX ORDER — EPHEDRINE SULFATE 50 MG/ML
5 INJECTION, SOLUTION INTRAVENOUS ONCE AS NEEDED
Status: DISCONTINUED | OUTPATIENT
Start: 2020-06-16 | End: 2020-06-16 | Stop reason: HOSPADM

## 2020-06-16 RX ORDER — PROMETHAZINE HYDROCHLORIDE 25 MG/1
25 SUPPOSITORY RECTAL ONCE AS NEEDED
Status: COMPLETED | OUTPATIENT
Start: 2020-06-16 | End: 2020-06-16

## 2020-06-16 RX ORDER — CEFAZOLIN SODIUM 2 G/100ML
2 INJECTION, SOLUTION INTRAVENOUS ONCE
Status: COMPLETED | OUTPATIENT
Start: 2020-06-16 | End: 2020-06-16

## 2020-06-16 RX ORDER — MIDAZOLAM HYDROCHLORIDE 1 MG/ML
1 INJECTION INTRAMUSCULAR; INTRAVENOUS
Status: DISCONTINUED | OUTPATIENT
Start: 2020-06-16 | End: 2020-06-16 | Stop reason: HOSPADM

## 2020-06-16 RX ADMIN — EPHEDRINE SULFATE 10 MG: 50 INJECTION INTRAVENOUS at 08:35

## 2020-06-16 RX ADMIN — SODIUM CHLORIDE, POTASSIUM CHLORIDE, SODIUM LACTATE AND CALCIUM CHLORIDE 9 ML/HR: 600; 310; 30; 20 INJECTION, SOLUTION INTRAVENOUS at 07:23

## 2020-06-16 RX ADMIN — PROPOFOL 200 MG: 10 INJECTION, EMULSION INTRAVENOUS at 08:19

## 2020-06-16 RX ADMIN — ONDANSETRON HYDROCHLORIDE 4 MG: 2 SOLUTION INTRAMUSCULAR; INTRAVENOUS at 08:52

## 2020-06-16 RX ADMIN — OXYCODONE HYDROCHLORIDE AND ACETAMINOPHEN 1 TABLET: 7.5; 325 TABLET ORAL at 09:35

## 2020-06-16 RX ADMIN — FENTANYL CITRATE 100 MCG: 50 INJECTION INTRAMUSCULAR; INTRAVENOUS at 08:19

## 2020-06-16 RX ADMIN — FAMOTIDINE 20 MG: 10 INJECTION, SOLUTION INTRAVENOUS at 07:30

## 2020-06-16 RX ADMIN — CEFAZOLIN SODIUM 2 G: 2 INJECTION, SOLUTION INTRAVENOUS at 08:24

## 2020-06-16 RX ADMIN — EPHEDRINE SULFATE 10 MG: 50 INJECTION INTRAVENOUS at 08:44

## 2020-06-16 RX ADMIN — DEXAMETHASONE SODIUM PHOSPHATE 8 MG: 10 INJECTION INTRAMUSCULAR; INTRAVENOUS at 08:30

## 2020-06-16 RX ADMIN — PROMETHAZINE HYDROCHLORIDE 25 MG: 25 TABLET ORAL at 09:35

## 2020-06-16 RX ADMIN — HYDROMORPHONE HYDROCHLORIDE 0.5 MG: 1 INJECTION, SOLUTION INTRAMUSCULAR; INTRAVENOUS; SUBCUTANEOUS at 09:35

## 2020-06-16 RX ADMIN — LIDOCAINE HYDROCHLORIDE 60 MG: 20 INJECTION, SOLUTION INFILTRATION; PERINEURAL at 08:19

## 2020-06-16 NOTE — BRIEF OP NOTE
KNEE ARTHROSCOPY  Progress Note    Grazyna Fritz  6/16/2020    Pre-op Diagnosis:   Rt mmt, mtp stress fx, djd       Post-Op Diagnosis Codes:   same    Procedure/CPT® Codes:      Procedure(s):  RIGHT KNEE ARTHROSCOPY PARTIAL MEDIAL MENISECTOMY SUBCHONDROPLASTY MEDIAL TIBIAL PLATEAU    Surgeon(s):  Nicolas Álvarez MD    Anesthesia: General    Staff:   Circulator: Katherine Warner RN  Radiology Technologist: Parisa Moore  Scrub Person: Alvaro Carreno  Vendor Representative: Annabel Sainz  Assistant: Afsaneh Garcia APRN    Estimated Blood Loss: 2 mL    Urine Voided: * No values recorded between 6/16/2020  8:12 AM and 6/16/2020  9:07 AM *    Specimens:                None          Drains: * No LDAs found *    Findings: above, djd    Complications: none known      WES Álvarez MD     Date: 6/16/2020  Time: 09:07

## 2020-06-16 NOTE — ANESTHESIA POSTPROCEDURE EVALUATION
Patient: Grazyna Fritz    Procedure Summary     Date:  06/16/20 Room / Location:  [facility]  /  [facility]    Anesthesia Start:  0815 Anesthesia Stop:  0917    Procedure:  RIGHT KNEE ARTHROSCOPY PARTIAL MEDIAL MENISECTOMY SUBCHONDROPLASTY MEDIAL TIBIAL PLATEAU (Right Knee) Diagnosis:      Surgeon:  Nicolas Álvarez MD Provider:  Yaya Cooley MD    Anesthesia Type:  general ASA Status:  2          Anesthesia Type: general    Vitals  Vitals Value Taken Time   /86 6/16/2020 10:00 AM   Temp 36.5 °C (97.7 °F) 6/16/2020  9:45 AM   Pulse 89 6/16/2020 10:00 AM   Resp 16 6/16/2020 10:00 AM   SpO2 92 % 6/16/2020 10:00 AM           Post Anesthesia Care and Evaluation    Patient location during evaluation: bedside  Patient participation: complete - patient participated  Level of consciousness: awake  Pain score: 1  Pain management: adequate  Airway patency: patent  Anesthetic complications: No anesthetic complications  PONV Status: controlled  Cardiovascular status: acceptable  Respiratory status: acceptable  Hydration status: acceptable    Comments: --------------------            06/16/20               1000     --------------------   BP:       141/86     Pulse:      89       Resp:       16       Temp:                SpO2:      92%      --------------------

## 2020-06-16 NOTE — ANESTHESIA PROCEDURE NOTES
Airway  Urgency: elective    Date/Time: 6/16/2020 8:23 AM    General Information and Staff    Patient location during procedure: OR  Anesthesiologist: Yaya Cooley MD  CRNA: Arlene Joyce CRNA    Indications and Patient Condition    Preoxygenated: yes  Mask difficulty assessment: 0 - not attempted    Final Airway Details  Final airway type: supraglottic airway      Successful airway: unique  Size 4    Number of attempts at approach: 1  Assessment: lips, teeth, and gum same as pre-op and atraumatic intubation    Additional Comments  Atraumatic, adeq seal, secured, MV/AV until SV

## 2020-06-16 NOTE — ANESTHESIA PREPROCEDURE EVALUATION
Anesthesia Evaluation     Patient summary reviewed and Nursing notes reviewed   NPO Solid Status: > 8 hours             Airway   Mallampati: II  TM distance: >3 FB  Neck ROM: full  no difficulty expected  Dental - normal exam     Pulmonary - negative pulmonary ROS and normal exam   Cardiovascular - normal exam    (+) hypertension,       Neuro/Psych  (+) psychiatric history Anxiety and Depression,     GI/Hepatic/Renal/Endo - negative ROS     Musculoskeletal (-) negative ROS    Abdominal  - normal exam   Substance History - negative use     OB/GYN negative ob/gyn ROS         Other                        Anesthesia Plan    ASA 2     general     intravenous induction     Anesthetic plan, all risks, benefits, and alternatives have been provided, discussed and informed consent has been obtained with: patient.    Plan discussed with CRNA.

## 2021-03-19 ENCOUNTER — BULK ORDERING (OUTPATIENT)
Dept: CASE MANAGEMENT | Facility: OTHER | Age: 66
End: 2021-03-19

## 2021-03-19 DIAGNOSIS — Z23 IMMUNIZATION DUE: ICD-10-CM

## 2022-05-17 ENCOUNTER — APPOINTMENT (OUTPATIENT)
Dept: GENERAL RADIOLOGY | Facility: HOSPITAL | Age: 67
End: 2022-05-17

## 2022-05-17 ENCOUNTER — HOSPITAL ENCOUNTER (INPATIENT)
Facility: HOSPITAL | Age: 67
LOS: 6 days | Discharge: HOME HEALTH | End: 2022-05-23
Attending: EMERGENCY MEDICINE | Admitting: HOSPITALIST
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
## 2022-05-17 DIAGNOSIS — S72.012A SUBCAPITAL FRACTURE OF HIP, LEFT, CLOSED, INITIAL ENCOUNTER: Primary | ICD-10-CM

## 2022-05-17 LAB
ALBUMIN SERPL-MCNC: 4.5 G/DL (ref 3.5–5.2)
ALBUMIN/GLOB SERPL: 2.4 G/DL
ALP SERPL-CCNC: 54 U/L (ref 39–117)
ALT SERPL W P-5'-P-CCNC: 33 U/L (ref 1–33)
ANION GAP SERPL CALCULATED.3IONS-SCNC: 12.2 MMOL/L (ref 5–15)
AST SERPL-CCNC: 38 U/L (ref 1–32)
BASOPHILS # BLD AUTO: 0.03 10*3/MM3 (ref 0–0.2)
BASOPHILS NFR BLD AUTO: 0.4 % (ref 0–1.5)
BILIRUB SERPL-MCNC: 0.9 MG/DL (ref 0–1.2)
BUN SERPL-MCNC: 13 MG/DL (ref 8–23)
BUN/CREAT SERPL: 17.1 (ref 7–25)
CALCIUM SPEC-SCNC: 9.3 MG/DL (ref 8.6–10.5)
CHLORIDE SERPL-SCNC: 104 MMOL/L (ref 98–107)
CO2 SERPL-SCNC: 24.8 MMOL/L (ref 22–29)
CREAT SERPL-MCNC: 0.76 MG/DL (ref 0.57–1)
DEPRECATED RDW RBC AUTO: 49.1 FL (ref 37–54)
EGFRCR SERPLBLD CKD-EPI 2021: 85.5 ML/MIN/1.73
EOSINOPHIL # BLD AUTO: 0.02 10*3/MM3 (ref 0–0.4)
EOSINOPHIL NFR BLD AUTO: 0.3 % (ref 0.3–6.2)
ERYTHROCYTE [DISTWIDTH] IN BLOOD BY AUTOMATED COUNT: 13.1 % (ref 12.3–15.4)
GLOBULIN UR ELPH-MCNC: 1.9 GM/DL
GLUCOSE SERPL-MCNC: 105 MG/DL (ref 65–99)
HCT VFR BLD AUTO: 39.4 % (ref 34–46.6)
HGB BLD-MCNC: 12.8 G/DL (ref 12–15.9)
IMM GRANULOCYTES # BLD AUTO: 0.03 10*3/MM3 (ref 0–0.05)
IMM GRANULOCYTES NFR BLD AUTO: 0.4 % (ref 0–0.5)
LYMPHOCYTES # BLD AUTO: 0.35 10*3/MM3 (ref 0.7–3.1)
LYMPHOCYTES NFR BLD AUTO: 4.6 % (ref 19.6–45.3)
MCH RBC QN AUTO: 33.2 PG (ref 26.6–33)
MCHC RBC AUTO-ENTMCNC: 32.5 G/DL (ref 31.5–35.7)
MCV RBC AUTO: 102.3 FL (ref 79–97)
MONOCYTES # BLD AUTO: 0.56 10*3/MM3 (ref 0.1–0.9)
MONOCYTES NFR BLD AUTO: 7.3 % (ref 5–12)
NEUTROPHILS NFR BLD AUTO: 6.67 10*3/MM3 (ref 1.7–7)
NEUTROPHILS NFR BLD AUTO: 87 % (ref 42.7–76)
NRBC BLD AUTO-RTO: 0 /100 WBC (ref 0–0.2)
NT-PROBNP SERPL-MCNC: 317 PG/ML (ref 0–900)
PLATELET # BLD AUTO: 126 10*3/MM3 (ref 140–450)
PMV BLD AUTO: 12 FL (ref 6–12)
POTASSIUM SERPL-SCNC: 4 MMOL/L (ref 3.5–5.2)
PROT SERPL-MCNC: 6.4 G/DL (ref 6–8.5)
QT INTERVAL: 381 MS
RBC # BLD AUTO: 3.85 10*6/MM3 (ref 3.77–5.28)
SARS-COV-2 RNA RESP QL NAA+PROBE: NOT DETECTED
SODIUM SERPL-SCNC: 141 MMOL/L (ref 136–145)
WBC NRBC COR # BLD: 7.66 10*3/MM3 (ref 3.4–10.8)

## 2022-05-17 PROCEDURE — 85025 COMPLETE CBC W/AUTO DIFF WBC: CPT | Performed by: EMERGENCY MEDICINE

## 2022-05-17 PROCEDURE — U0005 INFEC AGEN DETEC AMPLI PROBE: HCPCS | Performed by: PHYSICIAN ASSISTANT

## 2022-05-17 PROCEDURE — U0003 INFECTIOUS AGENT DETECTION BY NUCLEIC ACID (DNA OR RNA); SEVERE ACUTE RESPIRATORY SYNDROME CORONAVIRUS 2 (SARS-COV-2) (CORONAVIRUS DISEASE [COVID-19]), AMPLIFIED PROBE TECHNIQUE, MAKING USE OF HIGH THROUGHPUT TECHNOLOGIES AS DESCRIBED BY CMS-2020-01-R: HCPCS | Performed by: PHYSICIAN ASSISTANT

## 2022-05-17 PROCEDURE — 93005 ELECTROCARDIOGRAM TRACING: CPT | Performed by: PHYSICIAN ASSISTANT

## 2022-05-17 PROCEDURE — 0SRB06A REPLACEMENT OF LEFT HIP JOINT WITH OXIDIZED ZIRCONIUM ON POLYETHYLENE SYNTHETIC SUBSTITUTE, UNCEMENTED, OPEN APPROACH: ICD-10-PCS | Performed by: ORTHOPAEDIC SURGERY

## 2022-05-17 PROCEDURE — 71045 X-RAY EXAM CHEST 1 VIEW: CPT

## 2022-05-17 PROCEDURE — 83880 ASSAY OF NATRIURETIC PEPTIDE: CPT | Performed by: HOSPITALIST

## 2022-05-17 PROCEDURE — 99284 EMERGENCY DEPT VISIT MOD MDM: CPT

## 2022-05-17 PROCEDURE — 73502 X-RAY EXAM HIP UNI 2-3 VIEWS: CPT

## 2022-05-17 PROCEDURE — 80053 COMPREHEN METABOLIC PANEL: CPT | Performed by: EMERGENCY MEDICINE

## 2022-05-17 PROCEDURE — 25010000002 ONDANSETRON PER 1 MG: Performed by: EMERGENCY MEDICINE

## 2022-05-17 PROCEDURE — 93010 ELECTROCARDIOGRAM REPORT: CPT | Performed by: INTERNAL MEDICINE

## 2022-05-17 PROCEDURE — 25010000002 MORPHINE PER 10 MG: Performed by: EMERGENCY MEDICINE

## 2022-05-17 RX ORDER — AMLODIPINE BESYLATE 5 MG/1
5 TABLET ORAL NIGHTLY
Status: DISCONTINUED | OUTPATIENT
Start: 2022-05-17 | End: 2022-05-21

## 2022-05-17 RX ORDER — MORPHINE SULFATE 2 MG/ML
2 INJECTION, SOLUTION INTRAMUSCULAR; INTRAVENOUS EVERY 4 HOURS PRN
Status: DISCONTINUED | OUTPATIENT
Start: 2022-05-17 | End: 2022-05-21

## 2022-05-17 RX ORDER — MORPHINE SULFATE 2 MG/ML
4 INJECTION, SOLUTION INTRAMUSCULAR; INTRAVENOUS ONCE
Status: COMPLETED | OUTPATIENT
Start: 2022-05-17 | End: 2022-05-17

## 2022-05-17 RX ORDER — ATORVASTATIN CALCIUM 20 MG/1
20 TABLET, FILM COATED ORAL NIGHTLY
Status: DISCONTINUED | OUTPATIENT
Start: 2022-05-17 | End: 2022-05-18

## 2022-05-17 RX ORDER — SODIUM CHLORIDE 0.9 % (FLUSH) 0.9 %
10 SYRINGE (ML) INJECTION AS NEEDED
Status: DISCONTINUED | OUTPATIENT
Start: 2022-05-17 | End: 2022-05-23 | Stop reason: HOSPADM

## 2022-05-17 RX ORDER — CETIRIZINE HYDROCHLORIDE 10 MG/1
10 TABLET ORAL DAILY
Status: DISCONTINUED | OUTPATIENT
Start: 2022-05-17 | End: 2022-05-21

## 2022-05-17 RX ORDER — ESCITALOPRAM OXALATE 10 MG/1
10 TABLET ORAL NIGHTLY
Status: DISCONTINUED | OUTPATIENT
Start: 2022-05-17 | End: 2022-05-23 | Stop reason: HOSPADM

## 2022-05-17 RX ORDER — ASPIRIN 81 MG/1
81 TABLET, CHEWABLE ORAL DAILY
Status: DISCONTINUED | OUTPATIENT
Start: 2022-05-17 | End: 2022-05-23 | Stop reason: HOSPADM

## 2022-05-17 RX ORDER — PANTOPRAZOLE SODIUM 40 MG/1
40 TABLET, DELAYED RELEASE ORAL EVERY MORNING
Status: DISCONTINUED | OUTPATIENT
Start: 2022-05-18 | End: 2022-05-23 | Stop reason: HOSPADM

## 2022-05-17 RX ORDER — HYDROCODONE BITARTRATE AND ACETAMINOPHEN 5; 325 MG/1; MG/1
1 TABLET ORAL EVERY 6 HOURS PRN
Status: DISCONTINUED | OUTPATIENT
Start: 2022-05-17 | End: 2022-05-23

## 2022-05-17 RX ORDER — ESCITALOPRAM OXALATE 10 MG/1
10 TABLET ORAL EVERY EVENING
Status: DISCONTINUED | OUTPATIENT
Start: 2022-05-17 | End: 2022-05-17

## 2022-05-17 RX ORDER — ONDANSETRON 2 MG/ML
4 INJECTION INTRAMUSCULAR; INTRAVENOUS EVERY 6 HOURS PRN
Status: DISCONTINUED | OUTPATIENT
Start: 2022-05-17 | End: 2022-05-23

## 2022-05-17 RX ORDER — ONDANSETRON 2 MG/ML
4 INJECTION INTRAMUSCULAR; INTRAVENOUS ONCE
Status: COMPLETED | OUTPATIENT
Start: 2022-05-17 | End: 2022-05-17

## 2022-05-17 RX ORDER — LOSARTAN POTASSIUM 50 MG/1
50 TABLET ORAL NIGHTLY
Status: DISCONTINUED | OUTPATIENT
Start: 2022-05-17 | End: 2022-05-23 | Stop reason: HOSPADM

## 2022-05-17 RX ORDER — LOSARTAN POTASSIUM 50 MG/1
50 TABLET ORAL
Status: DISCONTINUED | OUTPATIENT
Start: 2022-05-17 | End: 2022-05-17

## 2022-05-17 RX ADMIN — ONDANSETRON 4 MG: 2 INJECTION INTRAMUSCULAR; INTRAVENOUS at 10:54

## 2022-05-17 RX ADMIN — ESCITALOPRAM 10 MG: 10 TABLET, FILM COATED ORAL at 21:15

## 2022-05-17 RX ADMIN — MORPHINE SULFATE 4 MG: 2 INJECTION, SOLUTION INTRAMUSCULAR; INTRAVENOUS at 10:54

## 2022-05-17 RX ADMIN — HYDROCODONE BITARTRATE AND ACETAMINOPHEN 1 TABLET: 5; 325 TABLET ORAL at 18:37

## 2022-05-17 RX ADMIN — ATORVASTATIN CALCIUM 20 MG: 20 TABLET, FILM COATED ORAL at 21:15

## 2022-05-17 RX ADMIN — ASPIRIN 81 MG: 81 TABLET, CHEWABLE ORAL at 21:15

## 2022-05-17 RX ADMIN — LOSARTAN POTASSIUM 50 MG: 50 TABLET, FILM COATED ORAL at 21:15

## 2022-05-17 RX ADMIN — AMLODIPINE BESYLATE 5 MG: 5 TABLET ORAL at 21:15

## 2022-05-18 ENCOUNTER — ANESTHESIA EVENT (OUTPATIENT)
Dept: PERIOP | Facility: HOSPITAL | Age: 67
End: 2022-05-18

## 2022-05-18 LAB
ALBUMIN SERPL-MCNC: 3.7 G/DL (ref 3.5–5.2)
ALBUMIN/GLOB SERPL: 1.7 G/DL
ALP SERPL-CCNC: 72 U/L (ref 39–117)
ALT SERPL W P-5'-P-CCNC: 116 U/L (ref 1–33)
ANION GAP SERPL CALCULATED.3IONS-SCNC: 9.1 MMOL/L (ref 5–15)
AST SERPL-CCNC: 105 U/L (ref 1–32)
BASOPHILS # BLD AUTO: 0.03 10*3/MM3 (ref 0–0.2)
BASOPHILS NFR BLD AUTO: 0.5 % (ref 0–1.5)
BILIRUB SERPL-MCNC: 0.8 MG/DL (ref 0–1.2)
BUN SERPL-MCNC: 11 MG/DL (ref 8–23)
BUN/CREAT SERPL: 19 (ref 7–25)
CALCIUM SPEC-SCNC: 8.7 MG/DL (ref 8.6–10.5)
CHLORIDE SERPL-SCNC: 104 MMOL/L (ref 98–107)
CHOLEST SERPL-MCNC: 142 MG/DL (ref 0–200)
CO2 SERPL-SCNC: 23.9 MMOL/L (ref 22–29)
CREAT SERPL-MCNC: 0.58 MG/DL (ref 0.57–1)
DEPRECATED RDW RBC AUTO: 47.1 FL (ref 37–54)
EGFRCR SERPLBLD CKD-EPI 2021: 98.7 ML/MIN/1.73
EOSINOPHIL # BLD AUTO: 0.12 10*3/MM3 (ref 0–0.4)
EOSINOPHIL NFR BLD AUTO: 1.9 % (ref 0.3–6.2)
ERYTHROCYTE [DISTWIDTH] IN BLOOD BY AUTOMATED COUNT: 12.9 % (ref 12.3–15.4)
GLOBULIN UR ELPH-MCNC: 2.2 GM/DL
GLUCOSE SERPL-MCNC: 104 MG/DL (ref 65–99)
HAV IGM SERPL QL IA: NORMAL
HBA1C MFR BLD: 4.6 % (ref 4.8–5.6)
HBV CORE IGM SERPL QL IA: NORMAL
HBV SURFACE AG SERPL QL IA: NORMAL
HCT VFR BLD AUTO: 36.4 % (ref 34–46.6)
HCV AB SER DONR QL: NORMAL
HDLC SERPL-MCNC: 86 MG/DL (ref 40–60)
HGB BLD-MCNC: 12 G/DL (ref 12–15.9)
IMM GRANULOCYTES # BLD AUTO: 0.02 10*3/MM3 (ref 0–0.05)
IMM GRANULOCYTES NFR BLD AUTO: 0.3 % (ref 0–0.5)
LDLC SERPL CALC-MCNC: 43 MG/DL (ref 0–100)
LDLC/HDLC SERPL: 0.51 {RATIO}
LYMPHOCYTES # BLD AUTO: 0.58 10*3/MM3 (ref 0.7–3.1)
LYMPHOCYTES NFR BLD AUTO: 9.3 % (ref 19.6–45.3)
MCH RBC QN AUTO: 32.8 PG (ref 26.6–33)
MCHC RBC AUTO-ENTMCNC: 33 G/DL (ref 31.5–35.7)
MCV RBC AUTO: 99.5 FL (ref 79–97)
MONOCYTES # BLD AUTO: 0.6 10*3/MM3 (ref 0.1–0.9)
MONOCYTES NFR BLD AUTO: 9.7 % (ref 5–12)
NEUTROPHILS NFR BLD AUTO: 4.86 10*3/MM3 (ref 1.7–7)
NEUTROPHILS NFR BLD AUTO: 78.3 % (ref 42.7–76)
NRBC BLD AUTO-RTO: 0.2 /100 WBC (ref 0–0.2)
PLATELET # BLD AUTO: 121 10*3/MM3 (ref 140–450)
PMV BLD AUTO: 12.1 FL (ref 6–12)
POTASSIUM SERPL-SCNC: 3.5 MMOL/L (ref 3.5–5.2)
PROT SERPL-MCNC: 5.9 G/DL (ref 6–8.5)
RBC # BLD AUTO: 3.66 10*6/MM3 (ref 3.77–5.28)
SODIUM SERPL-SCNC: 137 MMOL/L (ref 136–145)
TRIGL SERPL-MCNC: 62 MG/DL (ref 0–150)
TSH SERPL DL<=0.05 MIU/L-ACNC: 1.64 UIU/ML (ref 0.27–4.2)
VLDLC SERPL-MCNC: 13 MG/DL (ref 5–40)
WBC NRBC COR # BLD: 6.21 10*3/MM3 (ref 3.4–10.8)

## 2022-05-18 PROCEDURE — 83036 HEMOGLOBIN GLYCOSYLATED A1C: CPT | Performed by: HOSPITALIST

## 2022-05-18 PROCEDURE — 80061 LIPID PANEL: CPT | Performed by: HOSPITALIST

## 2022-05-18 PROCEDURE — 80053 COMPREHEN METABOLIC PANEL: CPT | Performed by: HOSPITALIST

## 2022-05-18 PROCEDURE — 85025 COMPLETE CBC W/AUTO DIFF WBC: CPT | Performed by: HOSPITALIST

## 2022-05-18 PROCEDURE — 84443 ASSAY THYROID STIM HORMONE: CPT | Performed by: HOSPITALIST

## 2022-05-18 PROCEDURE — 80074 ACUTE HEPATITIS PANEL: CPT | Performed by: HOSPITALIST

## 2022-05-18 RX ADMIN — HYDROCODONE BITARTRATE AND ACETAMINOPHEN 1 TABLET: 5; 325 TABLET ORAL at 03:10

## 2022-05-18 RX ADMIN — AMLODIPINE BESYLATE 5 MG: 5 TABLET ORAL at 20:47

## 2022-05-18 RX ADMIN — HYDROCODONE BITARTRATE AND ACETAMINOPHEN 1 TABLET: 5; 325 TABLET ORAL at 18:23

## 2022-05-18 RX ADMIN — HYDROCODONE BITARTRATE AND ACETAMINOPHEN 1 TABLET: 5; 325 TABLET ORAL at 12:34

## 2022-05-18 RX ADMIN — ASPIRIN 81 MG: 81 TABLET, CHEWABLE ORAL at 09:00

## 2022-05-18 RX ADMIN — CETIRIZINE HYDROCHLORIDE 10 MG: 10 TABLET ORAL at 09:00

## 2022-05-18 RX ADMIN — ESCITALOPRAM 10 MG: 10 TABLET, FILM COATED ORAL at 20:47

## 2022-05-18 RX ADMIN — LOSARTAN POTASSIUM 50 MG: 50 TABLET, FILM COATED ORAL at 20:47

## 2022-05-19 ENCOUNTER — APPOINTMENT (OUTPATIENT)
Dept: GENERAL RADIOLOGY | Facility: HOSPITAL | Age: 67
End: 2022-05-19

## 2022-05-19 ENCOUNTER — ANESTHESIA (OUTPATIENT)
Dept: PERIOP | Facility: HOSPITAL | Age: 67
End: 2022-05-19

## 2022-05-19 LAB
ALBUMIN SERPL-MCNC: 3.7 G/DL (ref 3.5–5.2)
ALBUMIN/GLOB SERPL: 1.5 G/DL
ALP SERPL-CCNC: 78 U/L (ref 39–117)
ALT SERPL W P-5'-P-CCNC: 93 U/L (ref 1–33)
ANION GAP SERPL CALCULATED.3IONS-SCNC: 10 MMOL/L (ref 5–15)
AST SERPL-CCNC: 55 U/L (ref 1–32)
BASOPHILS # BLD AUTO: 0.03 10*3/MM3 (ref 0–0.2)
BASOPHILS NFR BLD AUTO: 0.4 % (ref 0–1.5)
BILIRUB SERPL-MCNC: 0.7 MG/DL (ref 0–1.2)
BUN SERPL-MCNC: 12 MG/DL (ref 8–23)
BUN/CREAT SERPL: 21.1 (ref 7–25)
CALCIUM SPEC-SCNC: 8.9 MG/DL (ref 8.6–10.5)
CHLORIDE SERPL-SCNC: 103 MMOL/L (ref 98–107)
CO2 SERPL-SCNC: 27 MMOL/L (ref 22–29)
CREAT SERPL-MCNC: 0.57 MG/DL (ref 0.57–1)
DEPRECATED RDW RBC AUTO: 46.6 FL (ref 37–54)
EGFRCR SERPLBLD CKD-EPI 2021: 99.1 ML/MIN/1.73
EOSINOPHIL # BLD AUTO: 0.12 10*3/MM3 (ref 0–0.4)
EOSINOPHIL NFR BLD AUTO: 1.7 % (ref 0.3–6.2)
ERYTHROCYTE [DISTWIDTH] IN BLOOD BY AUTOMATED COUNT: 12.5 % (ref 12.3–15.4)
GLOBULIN UR ELPH-MCNC: 2.4 GM/DL
GLUCOSE BLDC GLUCOMTR-MCNC: 232 MG/DL (ref 70–130)
GLUCOSE SERPL-MCNC: 117 MG/DL (ref 65–99)
HCT VFR BLD AUTO: 39.4 % (ref 34–46.6)
HGB BLD-MCNC: 13 G/DL (ref 12–15.9)
LYMPHOCYTES # BLD AUTO: 0.83 10*3/MM3 (ref 0.7–3.1)
LYMPHOCYTES NFR BLD AUTO: 11.7 % (ref 19.6–45.3)
MCH RBC QN AUTO: 33.2 PG (ref 26.6–33)
MCHC RBC AUTO-ENTMCNC: 33 G/DL (ref 31.5–35.7)
MCV RBC AUTO: 100.5 FL (ref 79–97)
MONOCYTES # BLD AUTO: 0.74 10*3/MM3 (ref 0.1–0.9)
MONOCYTES NFR BLD AUTO: 10.4 % (ref 5–12)
NEUTROPHILS NFR BLD AUTO: 5.38 10*3/MM3 (ref 1.7–7)
NEUTROPHILS NFR BLD AUTO: 75.5 % (ref 42.7–76)
PLATELET # BLD AUTO: 144 10*3/MM3 (ref 140–450)
PMV BLD AUTO: 12 FL (ref 6–12)
POTASSIUM SERPL-SCNC: 4 MMOL/L (ref 3.5–5.2)
PROT SERPL-MCNC: 6.1 G/DL (ref 6–8.5)
RBC # BLD AUTO: 3.92 10*6/MM3 (ref 3.77–5.28)
SODIUM SERPL-SCNC: 140 MMOL/L (ref 136–145)
WBC NRBC COR # BLD: 7.12 10*3/MM3 (ref 3.4–10.8)

## 2022-05-19 PROCEDURE — 76000 FLUOROSCOPY <1 HR PHYS/QHP: CPT

## 2022-05-19 PROCEDURE — 82962 GLUCOSE BLOOD TEST: CPT

## 2022-05-19 PROCEDURE — 25010000002 ONDANSETRON PER 1 MG: Performed by: NURSE ANESTHETIST, CERTIFIED REGISTERED

## 2022-05-19 PROCEDURE — 25010000002 KETOROLAC TROMETHAMINE PER 15 MG: Performed by: ORTHOPAEDIC SURGERY

## 2022-05-19 PROCEDURE — 25010000002 PROPOFOL 10 MG/ML EMULSION: Performed by: NURSE ANESTHETIST, CERTIFIED REGISTERED

## 2022-05-19 PROCEDURE — 25010000002 DEXAMETHASONE PER 1 MG: Performed by: NURSE ANESTHETIST, CERTIFIED REGISTERED

## 2022-05-19 PROCEDURE — 25010000002 CLONIDINE PER 1 MG: Performed by: ORTHOPAEDIC SURGERY

## 2022-05-19 PROCEDURE — 97162 PT EVAL MOD COMPLEX 30 MIN: CPT

## 2022-05-19 PROCEDURE — 25010000002 CEFAZOLIN IN DEXTROSE 2-4 GM/100ML-% SOLUTION: Performed by: ORTHOPAEDIC SURGERY

## 2022-05-19 PROCEDURE — C1776 JOINT DEVICE (IMPLANTABLE): HCPCS | Performed by: ORTHOPAEDIC SURGERY

## 2022-05-19 PROCEDURE — 25010000002 NEOSTIGMINE 5 MG/10ML SOLUTION: Performed by: NURSE ANESTHETIST, CERTIFIED REGISTERED

## 2022-05-19 PROCEDURE — 25010000002 ROPIVACAINE PER 1 MG: Performed by: ORTHOPAEDIC SURGERY

## 2022-05-19 PROCEDURE — 25010000002 KETOROLAC TROMETHAMINE PER 15 MG: Performed by: NURSE ANESTHETIST, CERTIFIED REGISTERED

## 2022-05-19 PROCEDURE — 25010000002 ONDANSETRON PER 1 MG: Performed by: ORTHOPAEDIC SURGERY

## 2022-05-19 PROCEDURE — 73501 X-RAY EXAM HIP UNI 1 VIEW: CPT

## 2022-05-19 PROCEDURE — 97110 THERAPEUTIC EXERCISES: CPT

## 2022-05-19 PROCEDURE — 25010000002 EPINEPHRINE 1 MG/ML SOLUTION 30 ML VIAL: Performed by: ORTHOPAEDIC SURGERY

## 2022-05-19 PROCEDURE — 80053 COMPREHEN METABOLIC PANEL: CPT | Performed by: HOSPITALIST

## 2022-05-19 PROCEDURE — 25010000002 FENTANYL CITRATE (PF) 50 MCG/ML SOLUTION: Performed by: NURSE ANESTHETIST, CERTIFIED REGISTERED

## 2022-05-19 PROCEDURE — 85025 COMPLETE CBC W/AUTO DIFF WBC: CPT | Performed by: HOSPITALIST

## 2022-05-19 DEVICE — REFLECTION SPHERICAL HEAD SCREW 25MM
Type: IMPLANTABLE DEVICE | Site: HIP | Status: FUNCTIONAL
Brand: REFLECTION

## 2022-05-19 DEVICE — R3 3 HOLE ACETABULAR SHELL 52MM
Type: IMPLANTABLE DEVICE | Site: HIP | Status: FUNCTIONAL
Brand: R3 ACETABULAR

## 2022-05-19 DEVICE — OR3O DUAL MOBILITY LINER 40/52
Type: IMPLANTABLE DEVICE | Site: HIP | Status: FUNCTIONAL
Brand: OR3O DUAL MOBILITY

## 2022-05-19 DEVICE — DEV CONTRL TISS STRATAFIX SPIRAL MNCRYL UD 3/0 PLS 30CM: Type: IMPLANTABLE DEVICE | Site: HIP | Status: FUNCTIONAL

## 2022-05-19 DEVICE — IMPLANTABLE DEVICE: Type: IMPLANTABLE DEVICE | Site: HIP | Status: FUNCTIONAL

## 2022-05-19 DEVICE — POLARSTEM STANDARD NON-CEMENTED                                    WITH TI/HA 3
Type: IMPLANTABLE DEVICE | Site: HIP | Status: FUNCTIONAL
Brand: POLARSTEM

## 2022-05-19 DEVICE — OR3O DUAL MOBILITY XLPE INSERT 22/40
Type: IMPLANTABLE DEVICE | Site: HIP | Status: FUNCTIONAL
Brand: OR3O DUAL MOBILITY

## 2022-05-19 DEVICE — OXINIUM FEMORAL HEAD 12/14 TAPER                                    22 MM +0
Type: IMPLANTABLE DEVICE | Site: HIP | Status: FUNCTIONAL
Brand: OXINIUM

## 2022-05-19 RX ORDER — MAGNESIUM HYDROXIDE 1200 MG/15ML
LIQUID ORAL AS NEEDED
Status: DISCONTINUED | OUTPATIENT
Start: 2022-05-19 | End: 2022-05-19 | Stop reason: HOSPADM

## 2022-05-19 RX ORDER — MIDAZOLAM HYDROCHLORIDE 1 MG/ML
0.5 INJECTION INTRAMUSCULAR; INTRAVENOUS
Status: DISCONTINUED | OUTPATIENT
Start: 2022-05-19 | End: 2022-05-19 | Stop reason: HOSPADM

## 2022-05-19 RX ORDER — PROPOFOL 10 MG/ML
VIAL (ML) INTRAVENOUS AS NEEDED
Status: DISCONTINUED | OUTPATIENT
Start: 2022-05-19 | End: 2022-05-19 | Stop reason: SURG

## 2022-05-19 RX ORDER — DIPHENHYDRAMINE HYDROCHLORIDE 50 MG/ML
12.5 INJECTION INTRAMUSCULAR; INTRAVENOUS
Status: DISCONTINUED | OUTPATIENT
Start: 2022-05-19 | End: 2022-05-19 | Stop reason: HOSPADM

## 2022-05-19 RX ORDER — HYDROMORPHONE HYDROCHLORIDE 1 MG/ML
0.5 INJECTION, SOLUTION INTRAMUSCULAR; INTRAVENOUS; SUBCUTANEOUS
Status: DISCONTINUED | OUTPATIENT
Start: 2022-05-19 | End: 2022-05-19 | Stop reason: HOSPADM

## 2022-05-19 RX ORDER — CEFAZOLIN SODIUM 2 G/100ML
2 INJECTION, SOLUTION INTRAVENOUS ONCE
Status: COMPLETED | OUTPATIENT
Start: 2022-05-19 | End: 2022-05-19

## 2022-05-19 RX ORDER — ONDANSETRON 2 MG/ML
4 INJECTION INTRAMUSCULAR; INTRAVENOUS ONCE AS NEEDED
Status: COMPLETED | OUTPATIENT
Start: 2022-05-19 | End: 2022-05-19

## 2022-05-19 RX ORDER — FENTANYL CITRATE 50 UG/ML
INJECTION, SOLUTION INTRAMUSCULAR; INTRAVENOUS AS NEEDED
Status: DISCONTINUED | OUTPATIENT
Start: 2022-05-19 | End: 2022-05-19 | Stop reason: SURG

## 2022-05-19 RX ORDER — NALOXONE HCL 0.4 MG/ML
0.2 VIAL (ML) INJECTION AS NEEDED
Status: DISCONTINUED | OUTPATIENT
Start: 2022-05-19 | End: 2022-05-19 | Stop reason: HOSPADM

## 2022-05-19 RX ORDER — EPHEDRINE SULFATE 50 MG/ML
INJECTION, SOLUTION INTRAVENOUS AS NEEDED
Status: DISCONTINUED | OUTPATIENT
Start: 2022-05-19 | End: 2022-05-19 | Stop reason: SURG

## 2022-05-19 RX ORDER — PROMETHAZINE HYDROCHLORIDE 25 MG/1
25 SUPPOSITORY RECTAL ONCE AS NEEDED
Status: DISCONTINUED | OUTPATIENT
Start: 2022-05-19 | End: 2022-05-19 | Stop reason: HOSPADM

## 2022-05-19 RX ORDER — EPHEDRINE SULFATE 50 MG/ML
5 INJECTION, SOLUTION INTRAVENOUS ONCE AS NEEDED
Status: DISCONTINUED | OUTPATIENT
Start: 2022-05-19 | End: 2022-05-19 | Stop reason: HOSPADM

## 2022-05-19 RX ORDER — HYDRALAZINE HYDROCHLORIDE 20 MG/ML
5 INJECTION INTRAMUSCULAR; INTRAVENOUS
Status: DISCONTINUED | OUTPATIENT
Start: 2022-05-19 | End: 2022-05-19 | Stop reason: HOSPADM

## 2022-05-19 RX ORDER — OXYCODONE AND ACETAMINOPHEN 7.5; 325 MG/1; MG/1
1 TABLET ORAL EVERY 4 HOURS PRN
Status: DISCONTINUED | OUTPATIENT
Start: 2022-05-19 | End: 2022-05-19 | Stop reason: HOSPADM

## 2022-05-19 RX ORDER — IBUPROFEN 600 MG/1
600 TABLET ORAL ONCE AS NEEDED
Status: DISCONTINUED | OUTPATIENT
Start: 2022-05-19 | End: 2022-05-19 | Stop reason: HOSPADM

## 2022-05-19 RX ORDER — PROMETHAZINE HYDROCHLORIDE 25 MG/1
25 TABLET ORAL ONCE AS NEEDED
Status: DISCONTINUED | OUTPATIENT
Start: 2022-05-19 | End: 2022-05-19 | Stop reason: HOSPADM

## 2022-05-19 RX ORDER — SODIUM CHLORIDE, SODIUM LACTATE, POTASSIUM CHLORIDE, CALCIUM CHLORIDE 600; 310; 30; 20 MG/100ML; MG/100ML; MG/100ML; MG/100ML
9 INJECTION, SOLUTION INTRAVENOUS CONTINUOUS
Status: DISCONTINUED | OUTPATIENT
Start: 2022-05-19 | End: 2022-05-23

## 2022-05-19 RX ORDER — FENTANYL CITRATE 50 UG/ML
50 INJECTION, SOLUTION INTRAMUSCULAR; INTRAVENOUS
Status: DISCONTINUED | OUTPATIENT
Start: 2022-05-19 | End: 2022-05-19 | Stop reason: HOSPADM

## 2022-05-19 RX ORDER — LABETALOL HYDROCHLORIDE 5 MG/ML
5 INJECTION, SOLUTION INTRAVENOUS
Status: DISCONTINUED | OUTPATIENT
Start: 2022-05-19 | End: 2022-05-19 | Stop reason: HOSPADM

## 2022-05-19 RX ORDER — ONDANSETRON 2 MG/ML
INJECTION INTRAMUSCULAR; INTRAVENOUS AS NEEDED
Status: DISCONTINUED | OUTPATIENT
Start: 2022-05-19 | End: 2022-05-19 | Stop reason: SURG

## 2022-05-19 RX ORDER — LIDOCAINE HYDROCHLORIDE 20 MG/ML
INJECTION, SOLUTION INFILTRATION; PERINEURAL AS NEEDED
Status: DISCONTINUED | OUTPATIENT
Start: 2022-05-19 | End: 2022-05-19 | Stop reason: SURG

## 2022-05-19 RX ORDER — SODIUM CHLORIDE 0.9 % (FLUSH) 0.9 %
3 SYRINGE (ML) INJECTION EVERY 12 HOURS SCHEDULED
Status: DISCONTINUED | OUTPATIENT
Start: 2022-05-19 | End: 2022-05-19 | Stop reason: HOSPADM

## 2022-05-19 RX ORDER — NEOSTIGMINE METHYLSULFATE 0.5 MG/ML
INJECTION, SOLUTION INTRAVENOUS AS NEEDED
Status: DISCONTINUED | OUTPATIENT
Start: 2022-05-19 | End: 2022-05-19 | Stop reason: SURG

## 2022-05-19 RX ORDER — FAMOTIDINE 10 MG/ML
20 INJECTION, SOLUTION INTRAVENOUS ONCE
Status: COMPLETED | OUTPATIENT
Start: 2022-05-19 | End: 2022-05-19

## 2022-05-19 RX ORDER — DEXAMETHASONE SODIUM PHOSPHATE 10 MG/ML
INJECTION INTRAMUSCULAR; INTRAVENOUS AS NEEDED
Status: DISCONTINUED | OUTPATIENT
Start: 2022-05-19 | End: 2022-05-19 | Stop reason: SURG

## 2022-05-19 RX ORDER — TRANEXAMIC ACID 100 MG/ML
INJECTION, SOLUTION INTRAVENOUS AS NEEDED
Status: DISCONTINUED | OUTPATIENT
Start: 2022-05-19 | End: 2022-05-19 | Stop reason: SURG

## 2022-05-19 RX ORDER — KETOROLAC TROMETHAMINE 30 MG/ML
INJECTION, SOLUTION INTRAMUSCULAR; INTRAVENOUS AS NEEDED
Status: DISCONTINUED | OUTPATIENT
Start: 2022-05-19 | End: 2022-05-19 | Stop reason: SURG

## 2022-05-19 RX ORDER — FLUMAZENIL 0.1 MG/ML
0.2 INJECTION INTRAVENOUS AS NEEDED
Status: DISCONTINUED | OUTPATIENT
Start: 2022-05-19 | End: 2022-05-19 | Stop reason: HOSPADM

## 2022-05-19 RX ORDER — DIPHENHYDRAMINE HCL 25 MG
25 CAPSULE ORAL
Status: DISCONTINUED | OUTPATIENT
Start: 2022-05-19 | End: 2022-05-19 | Stop reason: HOSPADM

## 2022-05-19 RX ORDER — CEFAZOLIN SODIUM 2 G/100ML
2 INJECTION, SOLUTION INTRAVENOUS EVERY 8 HOURS
Status: COMPLETED | OUTPATIENT
Start: 2022-05-19 | End: 2022-05-20

## 2022-05-19 RX ORDER — SODIUM CHLORIDE 0.9 % (FLUSH) 0.9 %
3-10 SYRINGE (ML) INJECTION AS NEEDED
Status: DISCONTINUED | OUTPATIENT
Start: 2022-05-19 | End: 2022-05-19 | Stop reason: HOSPADM

## 2022-05-19 RX ORDER — LIDOCAINE HYDROCHLORIDE 10 MG/ML
0.5 INJECTION, SOLUTION EPIDURAL; INFILTRATION; INTRACAUDAL; PERINEURAL ONCE AS NEEDED
Status: DISCONTINUED | OUTPATIENT
Start: 2022-05-19 | End: 2022-05-19 | Stop reason: HOSPADM

## 2022-05-19 RX ORDER — ROCURONIUM BROMIDE 10 MG/ML
INJECTION, SOLUTION INTRAVENOUS AS NEEDED
Status: DISCONTINUED | OUTPATIENT
Start: 2022-05-19 | End: 2022-05-19 | Stop reason: SURG

## 2022-05-19 RX ORDER — GLYCOPYRROLATE 0.2 MG/ML
INJECTION INTRAMUSCULAR; INTRAVENOUS AS NEEDED
Status: DISCONTINUED | OUTPATIENT
Start: 2022-05-19 | End: 2022-05-19 | Stop reason: SURG

## 2022-05-19 RX ORDER — HYDROCODONE BITARTRATE AND ACETAMINOPHEN 7.5; 325 MG/1; MG/1
1 TABLET ORAL ONCE AS NEEDED
Status: DISCONTINUED | OUTPATIENT
Start: 2022-05-19 | End: 2022-05-19 | Stop reason: HOSPADM

## 2022-05-19 RX ADMIN — ONDANSETRON 4 MG: 2 INJECTION INTRAMUSCULAR; INTRAVENOUS at 09:07

## 2022-05-19 RX ADMIN — SODIUM CHLORIDE, POTASSIUM CHLORIDE, SODIUM LACTATE AND CALCIUM CHLORIDE: 600; 310; 30; 20 INJECTION, SOLUTION INTRAVENOUS at 07:52

## 2022-05-19 RX ADMIN — ONDANSETRON 4 MG: 2 INJECTION INTRAMUSCULAR; INTRAVENOUS at 14:25

## 2022-05-19 RX ADMIN — NEOSTIGMINE METHYLSULFATE 1 MG: 0.5 INJECTION INTRAVENOUS at 07:58

## 2022-05-19 RX ADMIN — LOSARTAN POTASSIUM 50 MG: 50 TABLET, FILM COATED ORAL at 21:45

## 2022-05-19 RX ADMIN — FAMOTIDINE 20 MG: 10 INJECTION INTRAVENOUS at 06:15

## 2022-05-19 RX ADMIN — GLYCOPYRROLATE 0.2 MG: 0.2 INJECTION INTRAMUSCULAR; INTRAVENOUS at 07:58

## 2022-05-19 RX ADMIN — AMLODIPINE BESYLATE 5 MG: 5 TABLET ORAL at 21:45

## 2022-05-19 RX ADMIN — DEXAMETHASONE SODIUM PHOSPHATE 8 MG: 10 INJECTION INTRAMUSCULAR; INTRAVENOUS at 07:21

## 2022-05-19 RX ADMIN — CEFAZOLIN SODIUM 2 G: 2 INJECTION, SOLUTION INTRAVENOUS at 21:45

## 2022-05-19 RX ADMIN — TRANEXAMIC ACID 1000 MG: 1 INJECTION, SOLUTION INTRAVENOUS at 07:19

## 2022-05-19 RX ADMIN — PROPOFOL 110 MG: 10 INJECTION, EMULSION INTRAVENOUS at 07:02

## 2022-05-19 RX ADMIN — ESCITALOPRAM 10 MG: 10 TABLET, FILM COATED ORAL at 21:46

## 2022-05-19 RX ADMIN — ROCURONIUM BROMIDE 40 MG: 50 INJECTION INTRAVENOUS at 07:02

## 2022-05-19 RX ADMIN — FENTANYL CITRATE 50 MCG: 0.05 INJECTION, SOLUTION INTRAMUSCULAR; INTRAVENOUS at 07:20

## 2022-05-19 RX ADMIN — SODIUM CHLORIDE, POTASSIUM CHLORIDE, SODIUM LACTATE AND CALCIUM CHLORIDE 9 ML/HR: 600; 310; 30; 20 INJECTION, SOLUTION INTRAVENOUS at 06:16

## 2022-05-19 RX ADMIN — LIDOCAINE HYDROCHLORIDE 100 MG: 20 INJECTION, SOLUTION INFILTRATION; PERINEURAL at 07:02

## 2022-05-19 RX ADMIN — CEFAZOLIN SODIUM 2 G: 2 INJECTION, SOLUTION INTRAVENOUS at 06:48

## 2022-05-19 RX ADMIN — EPHEDRINE SULFATE 10 MG: 50 INJECTION INTRAVENOUS at 07:54

## 2022-05-19 RX ADMIN — FENTANYL CITRATE 50 MCG: 0.05 INJECTION, SOLUTION INTRAMUSCULAR; INTRAVENOUS at 07:02

## 2022-05-19 RX ADMIN — CEFAZOLIN SODIUM 2 G: 2 INJECTION, SOLUTION INTRAVENOUS at 14:17

## 2022-05-19 RX ADMIN — ONDANSETRON 4 MG: 2 INJECTION INTRAMUSCULAR; INTRAVENOUS at 07:44

## 2022-05-19 RX ADMIN — KETOROLAC TROMETHAMINE 30 MG: 30 INJECTION, SOLUTION INTRAMUSCULAR at 07:46

## 2022-05-19 RX ADMIN — HYDROCODONE BITARTRATE AND ACETAMINOPHEN 1 TABLET: 5; 325 TABLET ORAL at 00:12

## 2022-05-19 RX ADMIN — HYDROCODONE BITARTRATE AND ACETAMINOPHEN 1 TABLET: 5; 325 TABLET ORAL at 21:46

## 2022-05-19 NOTE — ANESTHESIA PREPROCEDURE EVALUATION
Anesthesia Evaluation     Patient summary reviewed   no history of anesthetic complications:  NPO Solid Status: > 8 hours  NPO Liquid Status: > 2 hours           Airway   Mallampati: II  TM distance: >3 FB  Neck ROM: full  Small opening and No difficulty expected  Dental - normal exam     Pulmonary     breath sounds clear to auscultation  (-) shortness of breath, recent URI, not a smoker  Cardiovascular   Exercise tolerance: good (4-7 METS)    ECG reviewed  Rhythm: regular  Rate: normal    (+) hypertension, hyperlipidemia,   (-) past MI, dysrhythmias, angina      Neuro/Psych  (+) psychiatric history Anxiety and Depression,    (-) seizures, CVA  GI/Hepatic/Renal/Endo    (+)  GERD,    (-) no renal disease, diabetes    Musculoskeletal     (-) neck stiffness  Abdominal    Substance History      OB/GYN          Other   arthritis,                      Anesthesia Plan    ASA 2     general     intravenous induction     Anesthetic plan, all risks, benefits, and alternatives have been provided, discussed and informed consent has been obtained with: patient.  Use of blood products discussed with patient .   Plan discussed with CRNA.

## 2022-05-19 NOTE — ANESTHESIA PROCEDURE NOTES
Airway  Urgency: elective    Date/Time: 5/19/2022 7:03 AM  Airway not difficult    General Information and Staff    Patient location during procedure: OR  CRNA/CAA: Merlyn Mead CRNA    Indications and Patient Condition  Indications for airway management: airway protection    Preoxygenated: yes  MILS not maintained throughout  Mask difficulty assessment: 1 - vent by mask    Final Airway Details  Final airway type: endotracheal airway      Successful airway: ETT  Cuffed: yes   Successful intubation technique: direct laryngoscopy  Endotracheal tube insertion site: oral  Blade: David  Blade size: 3  ETT size (mm): 7.0  Cormack-Lehane Classification: grade I - full view of glottis  Placement verified by: chest auscultation and capnometry   Measured from: lips  ETT/EBT  to lips (cm): 21  Number of attempts at approach: 1  Assessment: lips, teeth, and gum same as pre-op and atraumatic intubation    Additional Comments  Dentition intact and unchanged. CBEBS.  +ETCO2.

## 2022-05-19 NOTE — ANESTHESIA POSTPROCEDURE EVALUATION
"Patient: Grazyna Fritz    Procedure Summary     Date: 05/19/22 Room / Location: [facility] OR  /  [facility]    Anesthesia Start: 0655 Anesthesia Stop: 0818    Procedure: TOTAL HIP ARTHROPLASTY ANTERIOR WITH HANA TABLE (Left Hip) Diagnosis:       Subcapital fracture of hip, left, closed, initial encounter (HCC)      (Subcapital fracture of hip, left, closed, initial encounter (HCC) [S72.012A])    Surgeons: Dav Adair II, MD Provider: Quincy Perez DO    Anesthesia Type: general ASA Status: 2          Anesthesia Type: general    Vitals  Vitals Value Taken Time   /73 05/19/22 0926   Temp 36.9 °C (98.4 °F) 05/19/22 0812   Pulse 92 05/19/22 0933   Resp 20 05/19/22 0910   SpO2 95 % 05/19/22 0933   Vitals shown include unvalidated device data.        Post Anesthesia Care and Evaluation    Pain management: adequate  Airway patency: patent  Anesthetic complications: No anesthetic complications    Cardiovascular status: acceptable  Respiratory status: acceptable  Hydration status: acceptable    Comments: /72   Pulse 77   Temp 36.9 °C (98.4 °F) (Oral)   Resp 20   Ht 165.1 cm (65\")   Wt 51.8 kg (114 lb 4.8 oz)   SpO2 95%   BMI 19.02 kg/m²         "

## 2022-05-20 LAB
ALBUMIN SERPL-MCNC: 3.1 G/DL (ref 3.5–5.2)
ALBUMIN/GLOB SERPL: 1.8 G/DL
ALP SERPL-CCNC: 71 U/L (ref 39–117)
ALT SERPL W P-5'-P-CCNC: 95 U/L (ref 1–33)
ANION GAP SERPL CALCULATED.3IONS-SCNC: 11 MMOL/L (ref 5–15)
AST SERPL-CCNC: 76 U/L (ref 1–32)
BASOPHILS # BLD AUTO: 0.02 10*3/MM3 (ref 0–0.2)
BASOPHILS NFR BLD AUTO: 0.2 % (ref 0–1.5)
BILIRUB SERPL-MCNC: 0.4 MG/DL (ref 0–1.2)
BUN SERPL-MCNC: 23 MG/DL (ref 8–23)
BUN/CREAT SERPL: 25.6 (ref 7–25)
CALCIUM SPEC-SCNC: 8.2 MG/DL (ref 8.6–10.5)
CHLORIDE SERPL-SCNC: 97 MMOL/L (ref 98–107)
CO2 SERPL-SCNC: 25 MMOL/L (ref 22–29)
CREAT SERPL-MCNC: 0.9 MG/DL (ref 0.57–1)
DEPRECATED RDW RBC AUTO: 46.4 FL (ref 37–54)
EGFRCR SERPLBLD CKD-EPI 2021: 69.8 ML/MIN/1.73
EOSINOPHIL # BLD AUTO: 0.01 10*3/MM3 (ref 0–0.4)
EOSINOPHIL NFR BLD AUTO: 0.1 % (ref 0.3–6.2)
ERYTHROCYTE [DISTWIDTH] IN BLOOD BY AUTOMATED COUNT: 12.5 % (ref 12.3–15.4)
GLOBULIN UR ELPH-MCNC: 1.7 GM/DL
GLUCOSE SERPL-MCNC: 125 MG/DL (ref 65–99)
HCT VFR BLD AUTO: 22.3 % (ref 34–46.6)
HGB BLD-MCNC: 7.4 G/DL (ref 12–15.9)
IMM GRANULOCYTES # BLD AUTO: 0.04 10*3/MM3 (ref 0–0.05)
IMM GRANULOCYTES NFR BLD AUTO: 0.4 % (ref 0–0.5)
LYMPHOCYTES # BLD AUTO: 0.9 10*3/MM3 (ref 0.7–3.1)
LYMPHOCYTES NFR BLD AUTO: 8.7 % (ref 19.6–45.3)
MCH RBC QN AUTO: 33.6 PG (ref 26.6–33)
MCHC RBC AUTO-ENTMCNC: 33.2 G/DL (ref 31.5–35.7)
MCV RBC AUTO: 101.4 FL (ref 79–97)
MONOCYTES # BLD AUTO: 1.28 10*3/MM3 (ref 0.1–0.9)
MONOCYTES NFR BLD AUTO: 12.4 % (ref 5–12)
NEUTROPHILS NFR BLD AUTO: 78.2 % (ref 42.7–76)
NEUTROPHILS NFR BLD AUTO: 8.04 10*3/MM3 (ref 1.7–7)
NRBC BLD AUTO-RTO: 0 /100 WBC (ref 0–0.2)
PLATELET # BLD AUTO: 134 10*3/MM3 (ref 140–450)
PMV BLD AUTO: 11.8 FL (ref 6–12)
POTASSIUM SERPL-SCNC: 4.4 MMOL/L (ref 3.5–5.2)
PROT SERPL-MCNC: 4.8 G/DL (ref 6–8.5)
RBC # BLD AUTO: 2.2 10*6/MM3 (ref 3.77–5.28)
SODIUM SERPL-SCNC: 133 MMOL/L (ref 136–145)
WBC NRBC COR # BLD: 10.29 10*3/MM3 (ref 3.4–10.8)

## 2022-05-20 PROCEDURE — 97535 SELF CARE MNGMENT TRAINING: CPT

## 2022-05-20 PROCEDURE — 25010000002 ONDANSETRON PER 1 MG: Performed by: ORTHOPAEDIC SURGERY

## 2022-05-20 PROCEDURE — 97530 THERAPEUTIC ACTIVITIES: CPT

## 2022-05-20 PROCEDURE — 97116 GAIT TRAINING THERAPY: CPT

## 2022-05-20 PROCEDURE — 85025 COMPLETE CBC W/AUTO DIFF WBC: CPT | Performed by: HOSPITALIST

## 2022-05-20 PROCEDURE — 80053 COMPREHEN METABOLIC PANEL: CPT | Performed by: HOSPITALIST

## 2022-05-20 PROCEDURE — 97166 OT EVAL MOD COMPLEX 45 MIN: CPT

## 2022-05-20 PROCEDURE — 93005 ELECTROCARDIOGRAM TRACING: CPT | Performed by: HOSPITALIST

## 2022-05-20 PROCEDURE — 93010 ELECTROCARDIOGRAM REPORT: CPT | Performed by: INTERNAL MEDICINE

## 2022-05-20 PROCEDURE — 25010000002 CEFAZOLIN IN DEXTROSE 2-4 GM/100ML-% SOLUTION: Performed by: ORTHOPAEDIC SURGERY

## 2022-05-20 RX ADMIN — AMLODIPINE BESYLATE 5 MG: 5 TABLET ORAL at 21:10

## 2022-05-20 RX ADMIN — CEFAZOLIN SODIUM 2 G: 2 INJECTION, SOLUTION INTRAVENOUS at 06:35

## 2022-05-20 RX ADMIN — CETIRIZINE HYDROCHLORIDE 10 MG: 10 TABLET ORAL at 06:18

## 2022-05-20 RX ADMIN — LOSARTAN POTASSIUM 50 MG: 50 TABLET, FILM COATED ORAL at 21:10

## 2022-05-20 RX ADMIN — HYDROCODONE BITARTRATE AND ACETAMINOPHEN 1 TABLET: 5; 325 TABLET ORAL at 19:45

## 2022-05-20 RX ADMIN — HYDROCODONE BITARTRATE AND ACETAMINOPHEN 1 TABLET: 5; 325 TABLET ORAL at 10:10

## 2022-05-20 RX ADMIN — ASPIRIN 81 MG: 81 TABLET, CHEWABLE ORAL at 08:32

## 2022-05-20 RX ADMIN — ONDANSETRON 4 MG: 2 INJECTION INTRAMUSCULAR; INTRAVENOUS at 06:23

## 2022-05-20 RX ADMIN — ESCITALOPRAM 10 MG: 10 TABLET, FILM COATED ORAL at 21:10

## 2022-05-20 RX ADMIN — PANTOPRAZOLE SODIUM 40 MG: 40 TABLET, DELAYED RELEASE ORAL at 06:18

## 2022-05-21 LAB
QT INTERVAL: 308 MS
QT INTERVAL: 323 MS
TROPONIN T SERPL-MCNC: <0.01 NG/ML (ref 0–0.03)

## 2022-05-21 PROCEDURE — 84484 ASSAY OF TROPONIN QUANT: CPT | Performed by: INTERNAL MEDICINE

## 2022-05-21 PROCEDURE — 99222 1ST HOSP IP/OBS MODERATE 55: CPT | Performed by: INTERNAL MEDICINE

## 2022-05-21 PROCEDURE — 97530 THERAPEUTIC ACTIVITIES: CPT

## 2022-05-21 PROCEDURE — 93010 ELECTROCARDIOGRAM REPORT: CPT | Performed by: INTERNAL MEDICINE

## 2022-05-21 RX ORDER — LORAZEPAM 0.5 MG/1
0.5 TABLET ORAL EVERY 8 HOURS PRN
Status: DISCONTINUED | OUTPATIENT
Start: 2022-05-21 | End: 2022-05-21 | Stop reason: SDUPTHER

## 2022-05-21 RX ORDER — LORAZEPAM 0.5 MG/1
0.5 TABLET ORAL EVERY 6 HOURS PRN
Status: DISCONTINUED | OUTPATIENT
Start: 2022-05-21 | End: 2022-05-23

## 2022-05-21 RX ORDER — LORAZEPAM 0.5 MG/1
0.5 TABLET ORAL 2 TIMES DAILY PRN
Status: DISCONTINUED | OUTPATIENT
Start: 2022-05-21 | End: 2022-05-21

## 2022-05-21 RX ADMIN — LORAZEPAM 0.5 MG: 0.5 TABLET ORAL at 09:33

## 2022-05-21 RX ADMIN — PANTOPRAZOLE SODIUM 40 MG: 40 TABLET, DELAYED RELEASE ORAL at 06:46

## 2022-05-21 RX ADMIN — ASPIRIN 81 MG: 81 TABLET, CHEWABLE ORAL at 08:12

## 2022-05-21 RX ADMIN — HYDROCODONE BITARTRATE AND ACETAMINOPHEN 1 TABLET: 5; 325 TABLET ORAL at 08:12

## 2022-05-21 RX ADMIN — HYDROCODONE BITARTRATE AND ACETAMINOPHEN 1 TABLET: 5; 325 TABLET ORAL at 19:02

## 2022-05-21 RX ADMIN — LOSARTAN POTASSIUM 50 MG: 50 TABLET, FILM COATED ORAL at 21:31

## 2022-05-21 RX ADMIN — ESCITALOPRAM 10 MG: 10 TABLET, FILM COATED ORAL at 21:31

## 2022-05-21 RX ADMIN — CETIRIZINE HYDROCHLORIDE 10 MG: 10 TABLET ORAL at 08:12

## 2022-05-22 ENCOUNTER — APPOINTMENT (OUTPATIENT)
Dept: CARDIOLOGY | Facility: HOSPITAL | Age: 67
End: 2022-05-22

## 2022-05-22 LAB
ABO GROUP BLD: NORMAL
ALBUMIN SERPL-MCNC: 3 G/DL (ref 3.5–5.2)
ALBUMIN/GLOB SERPL: 1.4 G/DL
ALP SERPL-CCNC: 124 U/L (ref 39–117)
ALT SERPL W P-5'-P-CCNC: 88 U/L (ref 1–33)
ANION GAP SERPL CALCULATED.3IONS-SCNC: 8 MMOL/L (ref 5–15)
AST SERPL-CCNC: 83 U/L (ref 1–32)
BASOPHILS # BLD AUTO: 0.04 10*3/MM3 (ref 0–0.2)
BASOPHILS NFR BLD AUTO: 0.7 % (ref 0–1.5)
BILIRUB SERPL-MCNC: 0.6 MG/DL (ref 0–1.2)
BLD GP AB SCN SERPL QL: NEGATIVE
BUN SERPL-MCNC: 19 MG/DL (ref 8–23)
BUN/CREAT SERPL: 33.3 (ref 7–25)
CALCIUM SPEC-SCNC: 8.3 MG/DL (ref 8.6–10.5)
CHLORIDE SERPL-SCNC: 99 MMOL/L (ref 98–107)
CO2 SERPL-SCNC: 26 MMOL/L (ref 22–29)
CREAT SERPL-MCNC: 0.57 MG/DL (ref 0.57–1)
DEPRECATED RDW RBC AUTO: 43.9 FL (ref 37–54)
EGFRCR SERPLBLD CKD-EPI 2021: 99.1 ML/MIN/1.73
EOSINOPHIL # BLD AUTO: 0.13 10*3/MM3 (ref 0–0.4)
EOSINOPHIL NFR BLD AUTO: 2.2 % (ref 0.3–6.2)
ERYTHROCYTE [DISTWIDTH] IN BLOOD BY AUTOMATED COUNT: 12.1 % (ref 12.3–15.4)
GLOBULIN UR ELPH-MCNC: 2.2 GM/DL
GLUCOSE SERPL-MCNC: 97 MG/DL (ref 65–99)
HCT VFR BLD AUTO: 18.3 % (ref 34–46.6)
HGB BLD-MCNC: 6 G/DL (ref 12–15.9)
IMM GRANULOCYTES # BLD AUTO: 0.02 10*3/MM3 (ref 0–0.05)
IMM GRANULOCYTES NFR BLD AUTO: 0.3 % (ref 0–0.5)
LYMPHOCYTES # BLD AUTO: 0.91 10*3/MM3 (ref 0.7–3.1)
LYMPHOCYTES NFR BLD AUTO: 15.5 % (ref 19.6–45.3)
MCH RBC QN AUTO: 32.8 PG (ref 26.6–33)
MCHC RBC AUTO-ENTMCNC: 32.8 G/DL (ref 31.5–35.7)
MCV RBC AUTO: 100 FL (ref 79–97)
MONOCYTES # BLD AUTO: 0.73 10*3/MM3 (ref 0.1–0.9)
MONOCYTES NFR BLD AUTO: 12.4 % (ref 5–12)
NEUTROPHILS NFR BLD AUTO: 4.05 10*3/MM3 (ref 1.7–7)
NEUTROPHILS NFR BLD AUTO: 68.9 % (ref 42.7–76)
NRBC BLD AUTO-RTO: 0 /100 WBC (ref 0–0.2)
PLATELET # BLD AUTO: 156 10*3/MM3 (ref 140–450)
PMV BLD AUTO: 11.6 FL (ref 6–12)
POTASSIUM SERPL-SCNC: 4.2 MMOL/L (ref 3.5–5.2)
PROT SERPL-MCNC: 5.2 G/DL (ref 6–8.5)
RBC # BLD AUTO: 1.83 10*6/MM3 (ref 3.77–5.28)
RH BLD: POSITIVE
SODIUM SERPL-SCNC: 133 MMOL/L (ref 136–145)
T&S EXPIRATION DATE: NORMAL
WBC NRBC COR # BLD: 5.88 10*3/MM3 (ref 3.4–10.8)

## 2022-05-22 PROCEDURE — 93306 TTE W/DOPPLER COMPLETE: CPT | Performed by: INTERNAL MEDICINE

## 2022-05-22 PROCEDURE — 86901 BLOOD TYPING SEROLOGIC RH(D): CPT

## 2022-05-22 PROCEDURE — 86900 BLOOD TYPING SEROLOGIC ABO: CPT | Performed by: HOSPITALIST

## 2022-05-22 PROCEDURE — 97530 THERAPEUTIC ACTIVITIES: CPT | Performed by: PHYSICAL THERAPIST

## 2022-05-22 PROCEDURE — 99232 SBSQ HOSP IP/OBS MODERATE 35: CPT | Performed by: NURSE PRACTITIONER

## 2022-05-22 PROCEDURE — 86900 BLOOD TYPING SEROLOGIC ABO: CPT

## 2022-05-22 PROCEDURE — 85025 COMPLETE CBC W/AUTO DIFF WBC: CPT | Performed by: HOSPITALIST

## 2022-05-22 PROCEDURE — 36430 TRANSFUSION BLD/BLD COMPNT: CPT

## 2022-05-22 PROCEDURE — 86850 RBC ANTIBODY SCREEN: CPT | Performed by: HOSPITALIST

## 2022-05-22 PROCEDURE — 93306 TTE W/DOPPLER COMPLETE: CPT

## 2022-05-22 PROCEDURE — P9016 RBC LEUKOCYTES REDUCED: HCPCS

## 2022-05-22 PROCEDURE — 86901 BLOOD TYPING SEROLOGIC RH(D): CPT | Performed by: HOSPITALIST

## 2022-05-22 PROCEDURE — 86923 COMPATIBILITY TEST ELECTRIC: CPT

## 2022-05-22 PROCEDURE — 80053 COMPREHEN METABOLIC PANEL: CPT | Performed by: HOSPITALIST

## 2022-05-22 RX ADMIN — ASPIRIN 81 MG: 81 TABLET, CHEWABLE ORAL at 08:47

## 2022-05-22 RX ADMIN — LORAZEPAM 0.5 MG: 0.5 TABLET ORAL at 14:20

## 2022-05-22 RX ADMIN — LOSARTAN POTASSIUM 50 MG: 50 TABLET, FILM COATED ORAL at 20:27

## 2022-05-22 RX ADMIN — HYDROCODONE BITARTRATE AND ACETAMINOPHEN 1 TABLET: 5; 325 TABLET ORAL at 08:47

## 2022-05-22 RX ADMIN — HYDROCODONE BITARTRATE AND ACETAMINOPHEN 1 TABLET: 5; 325 TABLET ORAL at 20:27

## 2022-05-22 RX ADMIN — PANTOPRAZOLE SODIUM 40 MG: 40 TABLET, DELAYED RELEASE ORAL at 06:25

## 2022-05-22 RX ADMIN — ESCITALOPRAM 10 MG: 10 TABLET, FILM COATED ORAL at 20:27

## 2022-05-23 VITALS
RESPIRATION RATE: 16 BRPM | SYSTOLIC BLOOD PRESSURE: 116 MMHG | OXYGEN SATURATION: 100 % | WEIGHT: 114.3 LBS | TEMPERATURE: 97 F | HEART RATE: 75 BPM | HEIGHT: 65 IN | BODY MASS INDEX: 19.04 KG/M2 | DIASTOLIC BLOOD PRESSURE: 69 MMHG

## 2022-05-23 LAB
ANION GAP SERPL CALCULATED.3IONS-SCNC: 10 MMOL/L (ref 5–15)
AORTIC DIMENSIONLESS INDEX: 0.7 (DI)
BASOPHILS # BLD AUTO: 0.05 10*3/MM3 (ref 0–0.2)
BASOPHILS NFR BLD AUTO: 0.9 % (ref 0–1.5)
BH BB BLOOD EXPIRATION DATE: NORMAL
BH BB BLOOD EXPIRATION DATE: NORMAL
BH BB BLOOD TYPE BARCODE: 5100
BH BB BLOOD TYPE BARCODE: 5100
BH BB DISPENSE STATUS: NORMAL
BH BB DISPENSE STATUS: NORMAL
BH BB PRODUCT CODE: NORMAL
BH BB PRODUCT CODE: NORMAL
BH BB UNIT NUMBER: NORMAL
BH BB UNIT NUMBER: NORMAL
BH CV ECHO MEAS - AO MAX PG: 7.3 MMHG
BH CV ECHO MEAS - AO MEAN PG: 4.1 MMHG
BH CV ECHO MEAS - AO ROOT DIAM: 3.1 CM
BH CV ECHO MEAS - AO V2 MAX: 134.8 CM/SEC
BH CV ECHO MEAS - AO V2 VTI: 25.5 CM
BH CV ECHO MEAS - AVA(I,D): 2.7 CM2
BH CV ECHO MEAS - EDV(CUBED): 90.1 ML
BH CV ECHO MEAS - EDV(MOD-SP2): 73 ML
BH CV ECHO MEAS - EDV(MOD-SP4): 71 ML
BH CV ECHO MEAS - EF(MOD-BP): 68 %
BH CV ECHO MEAS - EF(MOD-SP2): 71.2 %
BH CV ECHO MEAS - EF(MOD-SP4): 66.2 %
BH CV ECHO MEAS - ESV(CUBED): 33.3 ML
BH CV ECHO MEAS - ESV(MOD-SP2): 21 ML
BH CV ECHO MEAS - ESV(MOD-SP4): 24 ML
BH CV ECHO MEAS - FS: 28.2 %
BH CV ECHO MEAS - IVS/LVPW: 0.91 CM
BH CV ECHO MEAS - IVSD: 1.02 CM
BH CV ECHO MEAS - LAT PEAK E' VEL: 8.9 CM/SEC
BH CV ECHO MEAS - LV DIASTOLIC VOL/BSA (35-75): 45.1 CM2
BH CV ECHO MEAS - LV MASS(C)D: 166.7 GRAMS
BH CV ECHO MEAS - LV MAX PG: 3 MMHG
BH CV ECHO MEAS - LV MEAN PG: 2.09 MMHG
BH CV ECHO MEAS - LV SYSTOLIC VOL/BSA (12-30): 15.2 CM2
BH CV ECHO MEAS - LV V1 MAX: 86.5 CM/SEC
BH CV ECHO MEAS - LV V1 VTI: 18.7 CM
BH CV ECHO MEAS - LVIDD: 4.5 CM
BH CV ECHO MEAS - LVIDS: 3.2 CM
BH CV ECHO MEAS - LVOT AREA: 3.7 CM2
BH CV ECHO MEAS - LVOT DIAM: 2.17 CM
BH CV ECHO MEAS - LVPWD: 1.12 CM
BH CV ECHO MEAS - MED PEAK E' VEL: 6.6 CM/SEC
BH CV ECHO MEAS - MV A MAX VEL: 78 CM/SEC
BH CV ECHO MEAS - MV DEC SLOPE: 501.1 CM/SEC2
BH CV ECHO MEAS - MV DEC TIME: 168 MSEC
BH CV ECHO MEAS - MV E MAX VEL: 72.4 CM/SEC
BH CV ECHO MEAS - MV E/A: 0.93
BH CV ECHO MEAS - MV MAX PG: 3.8 MMHG
BH CV ECHO MEAS - MV MEAN PG: 2.13 MMHG
BH CV ECHO MEAS - MV P1/2T: 50.8 MSEC
BH CV ECHO MEAS - MV V2 VTI: 22.4 CM
BH CV ECHO MEAS - MVA(P1/2T): 4.3 CM2
BH CV ECHO MEAS - MVA(VTI): 3.1 CM2
BH CV ECHO MEAS - SI(MOD-SP2): 33 ML/M2
BH CV ECHO MEAS - SI(MOD-SP4): 29.8 ML/M2
BH CV ECHO MEAS - SV(LVOT): 69.1 ML
BH CV ECHO MEAS - SV(MOD-SP2): 52 ML
BH CV ECHO MEAS - SV(MOD-SP4): 47 ML
BH CV ECHO MEAS - TAPSE (>1.6): 2.3 CM
BH CV ECHO MEAS - TR MAX PG: 25.4 MMHG
BH CV ECHO MEAS - TR MAX VEL: 252.1 CM/SEC
BH CV ECHO MEASUREMENTS AVERAGE E/E' RATIO: 9.34
BH CV XLRA - RV BASE: 2.9 CM
BH CV XLRA - TDI S': 13.4 CM/SEC
BUN SERPL-MCNC: 18 MG/DL (ref 8–23)
BUN/CREAT SERPL: 32.7 (ref 7–25)
CALCIUM SPEC-SCNC: 8.4 MG/DL (ref 8.6–10.5)
CHLORIDE SERPL-SCNC: 102 MMOL/L (ref 98–107)
CO2 SERPL-SCNC: 28 MMOL/L (ref 22–29)
CREAT SERPL-MCNC: 0.55 MG/DL (ref 0.57–1)
CROSSMATCH INTERPRETATION: NORMAL
CROSSMATCH INTERPRETATION: NORMAL
DEPRECATED RDW RBC AUTO: 56.9 FL (ref 37–54)
EGFRCR SERPLBLD CKD-EPI 2021: 100 ML/MIN/1.73
EOSINOPHIL # BLD AUTO: 0.22 10*3/MM3 (ref 0–0.4)
EOSINOPHIL NFR BLD AUTO: 4.1 % (ref 0.3–6.2)
ERYTHROCYTE [DISTWIDTH] IN BLOOD BY AUTOMATED COUNT: 15.5 % (ref 12.3–15.4)
GLUCOSE SERPL-MCNC: 102 MG/DL (ref 65–99)
HCT VFR BLD AUTO: 26.6 % (ref 34–46.6)
HCT VFR BLD AUTO: 28.6 % (ref 34–46.6)
HGB BLD-MCNC: 9.1 G/DL (ref 12–15.9)
HGB BLD-MCNC: 9.2 G/DL (ref 12–15.9)
IMM GRANULOCYTES # BLD AUTO: 0.03 10*3/MM3 (ref 0–0.05)
IMM GRANULOCYTES NFR BLD AUTO: 0.6 % (ref 0–0.5)
LEFT ATRIUM VOLUME INDEX: 26.1 ML/M2
LYMPHOCYTES # BLD AUTO: 1.06 10*3/MM3 (ref 0.7–3.1)
LYMPHOCYTES NFR BLD AUTO: 19.6 % (ref 19.6–45.3)
MAXIMAL PREDICTED HEART RATE: 152 BPM
MCH RBC QN AUTO: 31.7 PG (ref 26.6–33)
MCHC RBC AUTO-ENTMCNC: 32.2 G/DL (ref 31.5–35.7)
MCV RBC AUTO: 98.6 FL (ref 79–97)
MONOCYTES # BLD AUTO: 0.64 10*3/MM3 (ref 0.1–0.9)
MONOCYTES NFR BLD AUTO: 11.8 % (ref 5–12)
NEUTROPHILS NFR BLD AUTO: 3.42 10*3/MM3 (ref 1.7–7)
NEUTROPHILS NFR BLD AUTO: 63 % (ref 42.7–76)
NRBC BLD AUTO-RTO: 0 /100 WBC (ref 0–0.2)
PLATELET # BLD AUTO: 187 10*3/MM3 (ref 140–450)
PMV BLD AUTO: 11 FL (ref 6–12)
POTASSIUM SERPL-SCNC: 4.1 MMOL/L (ref 3.5–5.2)
RBC # BLD AUTO: 2.9 10*6/MM3 (ref 3.77–5.28)
SODIUM SERPL-SCNC: 140 MMOL/L (ref 136–145)
STRESS TARGET HR: 129 BPM
UNIT  ABO: NORMAL
UNIT  ABO: NORMAL
UNIT  RH: NORMAL
UNIT  RH: NORMAL
WBC NRBC COR # BLD: 5.42 10*3/MM3 (ref 3.4–10.8)

## 2022-05-23 PROCEDURE — 85014 HEMATOCRIT: CPT | Performed by: HOSPITALIST

## 2022-05-23 PROCEDURE — 80048 BASIC METABOLIC PNL TOTAL CA: CPT | Performed by: HOSPITALIST

## 2022-05-23 PROCEDURE — 97535 SELF CARE MNGMENT TRAINING: CPT

## 2022-05-23 PROCEDURE — 97116 GAIT TRAINING THERAPY: CPT

## 2022-05-23 PROCEDURE — 85025 COMPLETE CBC W/AUTO DIFF WBC: CPT | Performed by: HOSPITALIST

## 2022-05-23 PROCEDURE — 99231 SBSQ HOSP IP/OBS SF/LOW 25: CPT | Performed by: NURSE PRACTITIONER

## 2022-05-23 PROCEDURE — 85018 HEMOGLOBIN: CPT | Performed by: HOSPITALIST

## 2022-05-23 RX ORDER — ASPIRIN 81 MG/1
81 TABLET, CHEWABLE ORAL DAILY
Qty: 30 TABLET | Refills: 0 | Status: SHIPPED | OUTPATIENT
Start: 2022-05-24 | End: 2022-06-23

## 2022-05-23 RX ORDER — HYDROCODONE BITARTRATE AND ACETAMINOPHEN 5; 325 MG/1; MG/1
1 TABLET ORAL ONCE AS NEEDED
Status: COMPLETED | OUTPATIENT
Start: 2022-05-23 | End: 2022-05-23

## 2022-05-23 RX ORDER — LOSARTAN POTASSIUM 50 MG/1
50 TABLET ORAL NIGHTLY
Qty: 30 TABLET | Refills: 0 | Status: SHIPPED | OUTPATIENT
Start: 2022-05-23 | End: 2022-06-22

## 2022-05-23 RX ORDER — HYDROCODONE BITARTRATE AND ACETAMINOPHEN 5; 325 MG/1; MG/1
1 TABLET ORAL ONCE AS NEEDED
Qty: 10 TABLET | Refills: 0 | Status: SHIPPED | OUTPATIENT
Start: 2022-05-23

## 2022-05-23 RX ADMIN — PANTOPRAZOLE SODIUM 40 MG: 40 TABLET, DELAYED RELEASE ORAL at 06:04

## 2022-05-23 RX ADMIN — HYDROCODONE BITARTRATE AND ACETAMINOPHEN 1 TABLET: 5; 325 TABLET ORAL at 08:24

## 2022-05-23 RX ADMIN — ASPIRIN 81 MG: 81 TABLET, CHEWABLE ORAL at 08:24

## 2022-05-23 RX ADMIN — HYDROCODONE BITARTRATE AND ACETAMINOPHEN 1 TABLET: 5; 325 TABLET ORAL at 15:07

## 2024-08-25 ENCOUNTER — APPOINTMENT (OUTPATIENT)
Dept: CT IMAGING | Facility: HOSPITAL | Age: 69
DRG: 896 | End: 2024-08-25
Payer: MEDICARE

## 2024-08-25 ENCOUNTER — APPOINTMENT (OUTPATIENT)
Dept: GENERAL RADIOLOGY | Facility: HOSPITAL | Age: 69
DRG: 896 | End: 2024-08-25
Payer: MEDICARE

## 2024-08-25 ENCOUNTER — HOSPITAL ENCOUNTER (INPATIENT)
Facility: HOSPITAL | Age: 69
LOS: 1 days | Discharge: HOME | DRG: 896 | End: 2024-08-28
Attending: EMERGENCY MEDICINE | Admitting: HOSPITALIST
Payer: MEDICARE

## 2024-08-25 DIAGNOSIS — R41.82 ALTERED MENTAL STATUS, UNSPECIFIED ALTERED MENTAL STATUS TYPE: ICD-10-CM

## 2024-08-25 DIAGNOSIS — F10.920 ALCOHOLIC INTOXICATION WITHOUT COMPLICATION: Primary | ICD-10-CM

## 2024-08-25 PROBLEM — F10.929 ALCOHOL INTOXICATION: Status: ACTIVE | Noted: 2024-08-25

## 2024-08-25 LAB
ALBUMIN SERPL-MCNC: 4.4 G/DL (ref 3.5–5.2)
ALBUMIN/GLOB SERPL: 2.3 G/DL
ALP SERPL-CCNC: 60 U/L (ref 39–117)
ALT SERPL W P-5'-P-CCNC: 24 U/L (ref 1–33)
AMPHET+METHAMPHET UR QL: NEGATIVE
ANION GAP SERPL CALCULATED.3IONS-SCNC: 13 MMOL/L (ref 5–15)
APAP SERPL-MCNC: 10 MCG/ML (ref 0–30)
AST SERPL-CCNC: 38 U/L (ref 1–32)
BARBITURATES UR QL SCN: NEGATIVE
BASOPHILS # BLD AUTO: 0.02 10*3/MM3 (ref 0–0.2)
BASOPHILS NFR BLD AUTO: 0.4 % (ref 0–1.5)
BENZODIAZ UR QL SCN: NEGATIVE
BILIRUB SERPL-MCNC: 0.2 MG/DL (ref 0–1.2)
BUN SERPL-MCNC: 10 MG/DL (ref 8–23)
BUN/CREAT SERPL: 15.6 (ref 7–25)
CALCIUM SPEC-SCNC: 9 MG/DL (ref 8.6–10.5)
CANNABINOIDS SERPL QL: NEGATIVE
CHLORIDE SERPL-SCNC: 95 MMOL/L (ref 98–107)
CK SERPL-CCNC: 121 U/L (ref 20–180)
CO2 SERPL-SCNC: 26 MMOL/L (ref 22–29)
COCAINE UR QL: NEGATIVE
CREAT SERPL-MCNC: 0.64 MG/DL (ref 0.57–1)
DEPRECATED RDW RBC AUTO: 52.2 FL (ref 37–54)
EGFRCR SERPLBLD CKD-EPI 2021: 94.6 ML/MIN/1.73
EOSINOPHIL # BLD AUTO: 0.01 10*3/MM3 (ref 0–0.4)
EOSINOPHIL NFR BLD AUTO: 0.2 % (ref 0.3–6.2)
ERYTHROCYTE [DISTWIDTH] IN BLOOD BY AUTOMATED COUNT: 13.9 % (ref 12.3–15.4)
ETHANOL BLD-MCNC: 179 MG/DL (ref 0–10)
ETHANOL UR QL: 0.18 %
FENTANYL UR-MCNC: NEGATIVE NG/ML
GEN 5 2HR TROPONIN T REFLEX: 12 NG/L
GLOBULIN UR ELPH-MCNC: 1.9 GM/DL
GLUCOSE SERPL-MCNC: 109 MG/DL (ref 65–99)
HCT VFR BLD AUTO: 31.9 % (ref 34–46.6)
HGB BLD-MCNC: 10.2 G/DL (ref 12–15.9)
IMM GRANULOCYTES # BLD AUTO: 0.01 10*3/MM3 (ref 0–0.05)
IMM GRANULOCYTES NFR BLD AUTO: 0.2 % (ref 0–0.5)
LYMPHOCYTES # BLD AUTO: 0.96 10*3/MM3 (ref 0.7–3.1)
LYMPHOCYTES NFR BLD AUTO: 20.6 % (ref 19.6–45.3)
MAGNESIUM SERPL-MCNC: 2 MG/DL (ref 1.6–2.4)
MCH RBC QN AUTO: 32.8 PG (ref 26.6–33)
MCHC RBC AUTO-ENTMCNC: 32 G/DL (ref 31.5–35.7)
MCV RBC AUTO: 102.6 FL (ref 79–97)
METHADONE UR QL SCN: NEGATIVE
MONOCYTES # BLD AUTO: 0.37 10*3/MM3 (ref 0.1–0.9)
MONOCYTES NFR BLD AUTO: 7.9 % (ref 5–12)
NEUTROPHILS NFR BLD AUTO: 3.29 10*3/MM3 (ref 1.7–7)
NEUTROPHILS NFR BLD AUTO: 70.7 % (ref 42.7–76)
NRBC BLD AUTO-RTO: 0 /100 WBC (ref 0–0.2)
OPIATES UR QL: NEGATIVE
OXYCODONE UR QL SCN: NEGATIVE
PHOSPHATE SERPL-MCNC: 3.7 MG/DL (ref 2.5–4.5)
PLATELET # BLD AUTO: 239 10*3/MM3 (ref 140–450)
PMV BLD AUTO: 9.9 FL (ref 6–12)
POTASSIUM SERPL-SCNC: 3.5 MMOL/L (ref 3.5–5.2)
PROT SERPL-MCNC: 6.3 G/DL (ref 6–8.5)
RBC # BLD AUTO: 3.11 10*6/MM3 (ref 3.77–5.28)
SALICYLATES SERPL-MCNC: <0.3 MG/DL
SODIUM SERPL-SCNC: 134 MMOL/L (ref 136–145)
TROPONIN T DELTA: 2 NG/L
TROPONIN T SERPL HS-MCNC: 10 NG/L
TSH SERPL DL<=0.05 MIU/L-ACNC: 0.88 UIU/ML (ref 0.27–4.2)
WBC NRBC COR # BLD AUTO: 4.66 10*3/MM3 (ref 3.4–10.8)

## 2024-08-25 PROCEDURE — 80307 DRUG TEST PRSMV CHEM ANLYZR: CPT | Performed by: EMERGENCY MEDICINE

## 2024-08-25 PROCEDURE — 84100 ASSAY OF PHOSPHORUS: CPT | Performed by: EMERGENCY MEDICINE

## 2024-08-25 PROCEDURE — G0378 HOSPITAL OBSERVATION PER HR: HCPCS

## 2024-08-25 PROCEDURE — 80179 DRUG ASSAY SALICYLATE: CPT | Performed by: EMERGENCY MEDICINE

## 2024-08-25 PROCEDURE — 71045 X-RAY EXAM CHEST 1 VIEW: CPT

## 2024-08-25 PROCEDURE — 70450 CT HEAD/BRAIN W/O DYE: CPT

## 2024-08-25 PROCEDURE — 93005 ELECTROCARDIOGRAM TRACING: CPT | Performed by: EMERGENCY MEDICINE

## 2024-08-25 PROCEDURE — 80143 DRUG ASSAY ACETAMINOPHEN: CPT | Performed by: EMERGENCY MEDICINE

## 2024-08-25 PROCEDURE — 25010000002 THIAMINE HCL 200 MG/2ML SOLUTION 2 ML VIAL: Performed by: EMERGENCY MEDICINE

## 2024-08-25 PROCEDURE — 82550 ASSAY OF CK (CPK): CPT | Performed by: EMERGENCY MEDICINE

## 2024-08-25 PROCEDURE — 82077 ASSAY SPEC XCP UR&BREATH IA: CPT | Performed by: EMERGENCY MEDICINE

## 2024-08-25 PROCEDURE — 85025 COMPLETE CBC W/AUTO DIFF WBC: CPT | Performed by: EMERGENCY MEDICINE

## 2024-08-25 PROCEDURE — 80053 COMPREHEN METABOLIC PANEL: CPT | Performed by: EMERGENCY MEDICINE

## 2024-08-25 PROCEDURE — 93010 ELECTROCARDIOGRAM REPORT: CPT | Performed by: INTERNAL MEDICINE

## 2024-08-25 PROCEDURE — 83735 ASSAY OF MAGNESIUM: CPT | Performed by: EMERGENCY MEDICINE

## 2024-08-25 PROCEDURE — 84484 ASSAY OF TROPONIN QUANT: CPT | Performed by: EMERGENCY MEDICINE

## 2024-08-25 PROCEDURE — 36415 COLL VENOUS BLD VENIPUNCTURE: CPT

## 2024-08-25 PROCEDURE — 84443 ASSAY THYROID STIM HORMONE: CPT | Performed by: EMERGENCY MEDICINE

## 2024-08-25 PROCEDURE — 99285 EMERGENCY DEPT VISIT HI MDM: CPT

## 2024-08-25 RX ORDER — FAMOTIDINE 20 MG/1
20 TABLET, FILM COATED ORAL 2 TIMES DAILY
Status: DISCONTINUED | OUTPATIENT
Start: 2024-08-26 | End: 2024-08-28 | Stop reason: HOSPADM

## 2024-08-25 RX ORDER — SODIUM CHLORIDE AND POTASSIUM CHLORIDE 150; 900 MG/100ML; MG/100ML
75 INJECTION, SOLUTION INTRAVENOUS CONTINUOUS
Status: DISCONTINUED | OUTPATIENT
Start: 2024-08-26 | End: 2024-08-28

## 2024-08-25 RX ORDER — FOLIC ACID 1 MG/1
1 TABLET ORAL DAILY
Status: DISCONTINUED | OUTPATIENT
Start: 2024-08-26 | End: 2024-08-28 | Stop reason: HOSPADM

## 2024-08-25 RX ORDER — DIPHENOXYLATE HYDROCHLORIDE AND ATROPINE SULFATE 2.5; .025 MG/1; MG/1
1 TABLET ORAL DAILY
Status: DISCONTINUED | OUTPATIENT
Start: 2024-08-26 | End: 2024-08-28 | Stop reason: HOSPADM

## 2024-08-25 RX ORDER — SODIUM CHLORIDE 0.9 % (FLUSH) 0.9 %
10 SYRINGE (ML) INJECTION AS NEEDED
Status: DISCONTINUED | OUTPATIENT
Start: 2024-08-25 | End: 2024-08-28 | Stop reason: HOSPADM

## 2024-08-25 RX ADMIN — THIAMINE HYDROCHLORIDE 500 MG: 100 INJECTION, SOLUTION INTRAMUSCULAR; INTRAVENOUS at 20:25

## 2024-08-25 NOTE — LETTER
[facility]  [phone]    ACCESS CENTER STATEMENT OF DISPOSITION    I, Grazyna Fritz, was assessed in the [facility] at [facility] [place] on 8/27/2024.  I understand the recommendations below and what follow-up action is expected of me.    Outpatient Counseling:  Mounika Boone [phone]    Allison Logan [phone]    Susie Prather [phone]    [facility] [phone]    Berenice [facility] [phone]    Abelardo Law [facility] [phone]      Outpatient Psychiatry (medications):   [facility] [phone]    Trish Williamson [phone]    Vania Álvarez [phone]    Jaspreet Beatty [phone]      ________________________________  Patient/Parent/Guardian/POA Signature    ________________________________  Clinician Signature    8/27/2024  19:59 EDT

## 2024-08-26 LAB
ALBUMIN SERPL-MCNC: 4.2 G/DL (ref 3.5–5.2)
ALBUMIN/GLOB SERPL: 2.2 G/DL
ALP SERPL-CCNC: 51 U/L (ref 39–117)
ALT SERPL W P-5'-P-CCNC: 24 U/L (ref 1–33)
ANION GAP SERPL CALCULATED.3IONS-SCNC: 8.7 MMOL/L (ref 5–15)
AST SERPL-CCNC: 34 U/L (ref 1–32)
BASOPHILS # BLD MANUAL: 0 10*3/MM3 (ref 0–0.2)
BASOPHILS NFR BLD MANUAL: 0 % (ref 0–1.5)
BILIRUB SERPL-MCNC: 0.5 MG/DL (ref 0–1.2)
BUN SERPL-MCNC: 8 MG/DL (ref 8–23)
BUN/CREAT SERPL: 13.6 (ref 7–25)
CALCIUM SPEC-SCNC: 9 MG/DL (ref 8.6–10.5)
CHLORIDE SERPL-SCNC: 100 MMOL/L (ref 98–107)
CO2 SERPL-SCNC: 27.3 MMOL/L (ref 22–29)
CREAT SERPL-MCNC: 0.59 MG/DL (ref 0.57–1)
DEPRECATED RDW RBC AUTO: 53.6 FL (ref 37–54)
EGFRCR SERPLBLD CKD-EPI 2021: 96.5 ML/MIN/1.73
EOSINOPHIL # BLD MANUAL: 0 10*3/MM3 (ref 0–0.4)
EOSINOPHIL NFR BLD MANUAL: 0 % (ref 0.3–6.2)
ERYTHROCYTE [DISTWIDTH] IN BLOOD BY AUTOMATED COUNT: 13.8 % (ref 12.3–15.4)
GLOBULIN UR ELPH-MCNC: 1.9 GM/DL
GLUCOSE SERPL-MCNC: 105 MG/DL (ref 65–99)
HCT VFR BLD AUTO: 31.8 % (ref 34–46.6)
HGB BLD-MCNC: 10.3 G/DL (ref 12–15.9)
LYMPHOCYTES # BLD MANUAL: 0.49 10*3/MM3 (ref 0.7–3.1)
LYMPHOCYTES NFR BLD MANUAL: 7 % (ref 5–12)
MCH RBC QN AUTO: 34.1 PG (ref 26.6–33)
MCHC RBC AUTO-ENTMCNC: 32.4 G/DL (ref 31.5–35.7)
MCV RBC AUTO: 105.3 FL (ref 79–97)
MONOCYTES # BLD: 0.49 10*3/MM3 (ref 0.1–0.9)
NEUTROPHILS # BLD AUTO: 6.04 10*3/MM3 (ref 1.7–7)
NEUTROPHILS NFR BLD MANUAL: 86 % (ref 42.7–76)
PLAT MORPH BLD: NORMAL
PLATELET # BLD AUTO: 201 10*3/MM3 (ref 140–450)
PMV BLD AUTO: 10.5 FL (ref 6–12)
POTASSIUM SERPL-SCNC: 3.8 MMOL/L (ref 3.5–5.2)
PROT SERPL-MCNC: 6.1 G/DL (ref 6–8.5)
RBC # BLD AUTO: 3.02 10*6/MM3 (ref 3.77–5.28)
RBC MORPH BLD: NORMAL
SODIUM SERPL-SCNC: 136 MMOL/L (ref 136–145)
VARIANT LYMPHS NFR BLD MANUAL: 7 % (ref 19.6–45.3)
WBC MORPH BLD: NORMAL
WBC NRBC COR # BLD AUTO: 7.02 10*3/MM3 (ref 3.4–10.8)

## 2024-08-26 PROCEDURE — G0378 HOSPITAL OBSERVATION PER HR: HCPCS

## 2024-08-26 PROCEDURE — 85007 BL SMEAR W/DIFF WBC COUNT: CPT | Performed by: HOSPITALIST

## 2024-08-26 PROCEDURE — 90791 PSYCH DIAGNOSTIC EVALUATION: CPT

## 2024-08-26 PROCEDURE — 85025 COMPLETE CBC W/AUTO DIFF WBC: CPT | Performed by: HOSPITALIST

## 2024-08-26 PROCEDURE — 25010000002 SODIUM CHLORIDE 0.9 % WITH KCL 20 MEQ 20-0.9 MEQ/L-% SOLUTION: Performed by: HOSPITALIST

## 2024-08-26 PROCEDURE — 80053 COMPREHEN METABOLIC PANEL: CPT | Performed by: HOSPITALIST

## 2024-08-26 RX ORDER — LORAZEPAM 0.5 MG/1
0.5 TABLET ORAL EVERY 4 HOURS PRN
Status: DISCONTINUED | OUTPATIENT
Start: 2024-08-26 | End: 2024-08-28

## 2024-08-26 RX ORDER — LORAZEPAM 0.5 MG/1
0.5 TABLET ORAL EVERY 8 HOURS PRN
Status: DISCONTINUED | OUTPATIENT
Start: 2024-08-26 | End: 2024-08-26

## 2024-08-26 RX ORDER — ESCITALOPRAM OXALATE 10 MG/1
10 TABLET ORAL NIGHTLY
Status: DISCONTINUED | OUTPATIENT
Start: 2024-08-26 | End: 2024-08-28 | Stop reason: HOSPADM

## 2024-08-26 RX ORDER — METOPROLOL TARTRATE 25 MG/1
12.5 TABLET, FILM COATED ORAL EVERY 12 HOURS SCHEDULED
Status: DISCONTINUED | OUTPATIENT
Start: 2024-08-26 | End: 2024-08-28 | Stop reason: HOSPADM

## 2024-08-26 RX ORDER — AMLODIPINE BESYLATE 10 MG/1
10 TABLET ORAL DAILY
Status: DISCONTINUED | OUTPATIENT
Start: 2024-08-26 | End: 2024-08-28 | Stop reason: HOSPADM

## 2024-08-26 RX ORDER — ASPIRIN 81 MG/1
81 TABLET, CHEWABLE ORAL DAILY
Status: DISCONTINUED | OUTPATIENT
Start: 2024-08-26 | End: 2024-08-28 | Stop reason: HOSPADM

## 2024-08-26 RX ADMIN — Medication 200 MG: at 08:08

## 2024-08-26 RX ADMIN — FAMOTIDINE 20 MG: 20 TABLET, FILM COATED ORAL at 00:46

## 2024-08-26 RX ADMIN — POTASSIUM CHLORIDE AND SODIUM CHLORIDE 125 ML/HR: 900; 150 INJECTION, SOLUTION INTRAVENOUS at 00:46

## 2024-08-26 RX ADMIN — LORAZEPAM 0.5 MG: 0.5 TABLET ORAL at 14:38

## 2024-08-26 RX ADMIN — FOLIC ACID 1 MG: 1 TABLET ORAL at 08:07

## 2024-08-26 RX ADMIN — METOPROLOL TARTRATE 12.5 MG: 25 TABLET, FILM COATED ORAL at 00:46

## 2024-08-26 RX ADMIN — Medication 1 TABLET: at 08:07

## 2024-08-26 RX ADMIN — METOPROLOL TARTRATE 12.5 MG: 25 TABLET, FILM COATED ORAL at 08:07

## 2024-08-26 RX ADMIN — POTASSIUM CHLORIDE AND SODIUM CHLORIDE 125 ML/HR: 900; 150 INJECTION, SOLUTION INTRAVENOUS at 09:51

## 2024-08-26 RX ADMIN — FAMOTIDINE 20 MG: 20 TABLET, FILM COATED ORAL at 20:40

## 2024-08-26 RX ADMIN — METOPROLOL TARTRATE 12.5 MG: 25 TABLET, FILM COATED ORAL at 20:41

## 2024-08-26 RX ADMIN — FAMOTIDINE 20 MG: 20 TABLET, FILM COATED ORAL at 08:07

## 2024-08-26 RX ADMIN — ASPIRIN 81 MG: 81 TABLET, CHEWABLE ORAL at 08:07

## 2024-08-26 RX ADMIN — ESCITALOPRAM OXALATE 10 MG: 10 TABLET, FILM COATED ORAL at 20:41

## 2024-08-26 RX ADMIN — AMLODIPINE BESYLATE 10 MG: 10 TABLET ORAL at 08:06

## 2024-08-26 RX ADMIN — LORAZEPAM 0.5 MG: 0.5 TABLET ORAL at 10:23

## 2024-08-26 RX ADMIN — ESCITALOPRAM OXALATE 10 MG: 10 TABLET, FILM COATED ORAL at 00:45

## 2024-08-26 RX ADMIN — POTASSIUM CHLORIDE AND SODIUM CHLORIDE 75 ML/HR: 900; 150 INJECTION, SOLUTION INTRAVENOUS at 22:30

## 2024-08-26 RX ADMIN — LORAZEPAM 0.5 MG: 0.5 TABLET ORAL at 21:35

## 2024-08-26 NOTE — PLAN OF CARE
Goal Outcome Evaluation:  Plan of Care Reviewed With: patient        Progress: improving  Outcome Evaluation: Patient is alert and oriented x4. VSS, with slightly tachy HR (90) and BP elevated into 160s/80s. Fluids infusing wihtout diffiuclty. Fall precuations implemented. Hand washing promoted. Son contacted about move to [facility] from ED- came with alcohol withdrawl. CIWA completed q4 since arrival. Shaking noted, no sweating, halluncinations, etc. Pt very anxious throughout night and barely slept. SCDs ordered from central inventory. Pt voiding well and often- urine clear yellow. POC ongoing.

## 2024-08-26 NOTE — H&P
History and physical    Primary care physician      Chief complaint  Altered mental status    History of present illness  69-year-old white female with history of hypertension hyperlipidemia anxiety disorder depression and chronic anemia who is well-known to our service brought to the emergency room with altered mental status.  Patient has been abusing alcohol since her  .  Patient denies any headache dizziness chest pain shortness of breath abdominal pain nausea vomiting diarrhea and no speech or focal deficit.  Patient workup in ER revealed alcohol intoxication admitted for management.     PAST MEDICAL HISTORY   Anxiety and depression      Arthritis      Basal cell carcinoma of skin     Cataract      Elevated cholesterol      GERD (gastroesophageal reflux disease)      Hypertension      Right knee pain      Seasonal allergies      Urge incontinence        PAST SURGICAL HISTORY              Procedure Laterality Date    BASAL CELL CARCINOMA EXCISION        FACE    BUNIONECTOMY Right 2014    COLONOSCOPY N/A      [facility]    COLONOSCOPY N/A 2019     Procedure: COLONOSCOPY;  Surgeon: Berkley Herring MD;  Location: [facility] ENDOSCOPY;  Service: General    CYST REMOVAL N/A 2018     In-office procedure: Excision of 2.0 sebaceous cyst of the left midback, Dr. Berkley Herring    FOOT FUSION Left 2017     Procedure: LT TIBIAL CALCANEAL & MID FOOT OSTEOTOMIES W/ BONE GRAFT LT PERONEUS BREVIS LONGUS TENDON TRANSFER TENDO ACHILLES LENGTHENING ;  Surgeon: Ok Ferrari MD;  Location:  [facility];  Service:     KNEE ARTHROSCOPY Right 2020     Procedure: RIGHT KNEE ARTHROSCOPY PARTIAL MEDIAL MENISECTOMY SUBCHONDROPLASTY MEDIAL TIBIAL PLATEAU;  Surgeon: Nicolas Álvarez MD;  Location: [facility] OR [facility];  Service: Orthopedics;  Laterality: Right;    LAPAROSCOPIC CHOLECYSTECTOMY W/ CHOLANGIOGRAPHY N/A 2006     Dr. Toby Woo    TOTAL HIP ARTHROPLASTY  "Left 5/19/2022     Procedure: TOTAL HIP ARTHROPLASTY ANTERIOR WITH HANA TABLE;  Surgeon: aDv Adair II, MD;  Location: [facility] OR;  Service: Orthopedics;  Laterality: Left;    TUBAL ABDOMINAL LIGATION Bilateral           FAMILY HISTORY           Problem Relation Age of Onset    Lung cancer Father      Cervical cancer Sister      [place] Hyperthermia Neg Hx        SOCIAL HISTORY                Socioeconomic History    Marital status:    Tobacco Use    Smoking status: Never    Smokeless tobacco: Never   Vaping Use    Vaping status: Never Used   Substance and Sexual Activity    Alcohol use: No    Drug use: No    Sexual activity: Defer         ALLERGIES  Patient has no known allergies.  Home medications reviewed     REVIEW OF SYSTEMS  All systems reviewed and negative except for those discussed in HPI.      PHYSICAL EXAM  Blood pressure 157/80, pulse 65, temperature 97.7 °F (36.5 °C), temperature source Oral, resp. rate 16, height 167.6 cm (66\"), weight 54.4 kg (120 lb), SpO2 97%.    GENERAL: Awake and alert no acute distress  HENT: nares patent  EYES: no scleral icterus  CV: regular rhythm, normal rate  RESPIRATORY: normal effort, moving air bilaterally  ABDOMEN: soft, nontender nondistended bowel sounds positive  MUSCULOSKELETAL: no deformity  NEURO: Awake and alert with no focal deficit  PSYCH:  calm, cooperative  SKIN: warm, dry     LAB RESULTS  Lab Results (last 24 hours)       Procedure Component Value Units Date/Time    CBC & Differential [[id number]]  (Abnormal) Collected: 08/26/24 0453    Specimen: Blood Updated: 08/26/24 0701    Narrative:      The following orders were created for panel order CBC & Differential.  Procedure                               Abnormality         Status                     ---------                               -----------         ------                     CBC Auto Differential[[id number]]        Abnormal            Final result                 Please view " results for these tests on the individual orders.    CBC Auto Differential [[id number]]  (Abnormal) Collected: 08/26/24 0453    Specimen: Blood Updated: 08/26/24 0701     WBC 7.02 10*3/mm3      RBC 3.02 10*6/mm3      Hemoglobin 10.3 g/dL      Hematocrit 31.8 %      .3 fL      MCH 34.1 pg      MCHC 32.4 g/dL      RDW 13.8 %      RDW-SD 53.6 fl      MPV 10.5 fL      Platelets 201 10*3/mm3     Manual Differential [[id number]]  (Abnormal) Collected: 08/26/24 0453    Specimen: Blood Updated: 08/26/24 0701     Neutrophil % 86.0 %      Lymphocyte % 7.0 %      Monocyte % 7.0 %      Eosinophil % 0.0 %      Basophil % 0.0 %      Neutrophils Absolute 6.04 10*3/mm3      Lymphocytes Absolute 0.49 10*3/mm3      Monocytes Absolute 0.49 10*3/mm3      Eosinophils Absolute 0.00 10*3/mm3      Basophils Absolute 0.00 10*3/mm3      RBC Morphology Normal     WBC Morphology Normal     Platelet Morphology Normal    Comprehensive Metabolic Panel [[id number]]  (Abnormal) Collected: 08/26/24 0453    Specimen: Blood Updated: 08/26/24 0629     Glucose 105 mg/dL      BUN 8 mg/dL      Creatinine 0.59 mg/dL      Sodium 136 mmol/L      Potassium 3.8 mmol/L      Chloride 100 mmol/L      CO2 27.3 mmol/L      Calcium 9.0 mg/dL      Total Protein 6.1 g/dL      Albumin 4.2 g/dL      ALT (SGPT) 24 U/L      AST (SGOT) 34 U/L      Alkaline Phosphatase 51 U/L      Total Bilirubin 0.5 mg/dL      Globulin 1.9 gm/dL      A/G Ratio 2.2 g/dL      BUN/Creatinine Ratio 13.6     Anion Gap 8.7 mmol/L      eGFR 96.5 mL/min/1.73     Narrative:      GFR Normal >60  Chronic Kidney Disease <60  Kidney Failure <15    The GFR formula is only valid for adults with stable renal function between ages 18 and 70.    High Sensitivity Troponin T 2Hr [[id number]]  (Normal) Collected: 08/25/24 2119    Specimen: Blood from Arm, Left Updated: 08/25/24 2155     HS Troponin T 12 ng/L      Troponin T Delta 2 ng/L     Narrative:      High Sensitive Troponin T Reference Range:  <14.0  ng/L- Negative Female for AMI  <22.0 ng/L- Negative Male for AMI  >=14 - Abnormal Female indicating possible myocardial injury.  >=22 - Abnormal Male indicating possible myocardial injury.   Clinicians would have to utilize clinical acumen, EKG, Troponin, and serial changes to determine if it is an Acute Myocardial Infarction or myocardial injury due to an underlying chronic condition.         Urine Drug Screen - Urine, Clean Catch [[id number]]  (Normal) Collected: 08/25/24 1925    Specimen: Urine, Clean Catch Updated: 08/25/24 2008     Amphet/Methamphet, Screen Negative     Barbiturates Screen, Urine Negative     Benzodiazepine Screen, Urine Negative     Cocaine Screen, Urine Negative     Opiate Screen Negative     THC, Screen, Urine Negative     Methadone Screen, Urine Negative     Oxycodone Screen, Urine Negative     Fentanyl, Urine Negative    Narrative:      Negative Thresholds Per Drugs Screened:    Amphetamines                 500 ng/ml  Barbiturates                 200 ng/ml  Benzodiazepines              100 ng/ml  Cocaine                      300 ng/ml  Methadone                    300 ng/ml  Opiates                      300 ng/ml  Oxycodone                    100 ng/ml  THC                           50 ng/ml  Fentanyl                       5 ng/ml      The Normal Value for all drugs tested is negative. This report includes final unconfirmed screening results to be used for medical treatment purposes only. Unconfirmed results must not be used for non-medical purposes such as employment or legal testing. Clinical consideration should be applied to any drug of abuse test, particularly when unconfirmed results are used.            High Sensitivity Troponin T [[id number]]  (Normal) Collected: 08/25/24 1841    Specimen: Blood Updated: 08/25/24 1940     HS Troponin T 10 ng/L     Narrative:      High Sensitive Troponin T Reference Range:  <14.0 ng/L- Negative Female for AMI  <22.0 ng/L- Negative Male for AMI  >=14 -  Abnormal Female indicating possible myocardial injury.  >=22 - Abnormal Male indicating possible myocardial injury.   Clinicians would have to utilize clinical acumen, EKG, Troponin, and serial changes to determine if it is an Acute Myocardial Infarction or myocardial injury due to an underlying chronic condition.         TSH [[id number]]  (Normal) Collected: 08/25/24 1841    Specimen: Blood Updated: 08/25/24 1940     TSH 0.879 uIU/mL     Ethanol [[id number]]  (Abnormal) Collected: 08/25/24 1841    Specimen: Blood Updated: 08/25/24 1933     Ethanol 179 mg/dL      Ethanol % 0.179 %     Acetaminophen Level [[id number]]  (Normal) Collected: 08/25/24 1841    Specimen: Blood Updated: 08/25/24 1933     Acetaminophen 10.0 mcg/mL     Salicylate Level [[id number]]  (Normal) Collected: 08/25/24 1841    Specimen: Blood Updated: 08/25/24 1933     Salicylate <0.3 mg/dL     Narrative:      Therapeutic range for Salicylates:  3.0 - 10.0 mg/dL for antipyretic/analgesic conditions  15.0 - 30.0 mg/dL for anti-inflammatory conditions    CK [[id number]]  (Normal) Collected: 08/25/24 1841    Specimen: Blood Updated: 08/25/24 1933     Creatine Kinase 121 U/L     Magnesium [[id number]]  (Normal) Collected: 08/25/24 1841    Specimen: Blood Updated: 08/25/24 1933     Magnesium 2.0 mg/dL     Phosphorus [[id number]]  (Normal) Collected: 08/25/24 1841    Specimen: Blood Updated: 08/25/24 1933     Phosphorus 3.7 mg/dL     Comprehensive Metabolic Panel [[id number]]  (Abnormal) Collected: 08/25/24 1841    Specimen: Blood Updated: 08/25/24 1913     Glucose 109 mg/dL      BUN 10 mg/dL      Creatinine 0.64 mg/dL      Sodium 134 mmol/L      Potassium 3.5 mmol/L      Chloride 95 mmol/L      CO2 26.0 mmol/L      Calcium 9.0 mg/dL      Total Protein 6.3 g/dL      Albumin 4.4 g/dL      ALT (SGPT) 24 U/L      AST (SGOT) 38 U/L      Alkaline Phosphatase 60 U/L      Total Bilirubin 0.2 mg/dL      Globulin 1.9 gm/dL      A/G Ratio 2.3 g/dL      BUN/Creatinine Ratio  15.6     Anion Gap 13.0 mmol/L      eGFR 94.6 mL/min/1.73     Narrative:      GFR Normal >60  Chronic Kidney Disease <60  Kidney Failure <15    The GFR formula is only valid for adults with stable renal function between ages 18 and 70.    CBC & Differential [[id number]]  (Abnormal) Collected: 08/25/24 1841    Specimen: Blood Updated: 08/25/24 1853    Narrative:      The following orders were created for panel order CBC & Differential.  Procedure                               Abnormality         Status                     ---------                               -----------         ------                     CBC Auto Differential[[id number]]        Abnormal            Final result                 Please view results for these tests on the individual orders.    CBC Auto Differential [[id number]]  (Abnormal) Collected: 08/25/24 1841    Specimen: Blood Updated: 08/25/24 1853     WBC 4.66 10*3/mm3      RBC 3.11 10*6/mm3      Hemoglobin 10.2 g/dL      Hematocrit 31.9 %      .6 fL      MCH 32.8 pg      MCHC 32.0 g/dL      RDW 13.9 %      RDW-SD 52.2 fl      MPV 9.9 fL      Platelets 239 10*3/mm3      Neutrophil % 70.7 %      Lymphocyte % 20.6 %      Monocyte % 7.9 %      Eosinophil % 0.2 %      Basophil % 0.4 %      Immature Grans % 0.2 %      Neutrophils, Absolute 3.29 10*3/mm3      Lymphocytes, Absolute 0.96 10*3/mm3      Monocytes, Absolute 0.37 10*3/mm3      Eosinophils, Absolute 0.01 10*3/mm3      Basophils, Absolute 0.02 10*3/mm3      Immature Grans, Absolute 0.01 10*3/mm3      nRBC 0.0 /100 WBC           Imaging Results (Last 24 Hours)       Procedure Component Value Units Date/Time    CT Head Without Contrast [[id number]] Collected: 08/25/24 2004     Updated: 08/25/24 2008    Narrative:      CT HEAD WO CONTRAST-     HISTORY:  ams     COMPARISON: None     FINDINGS: The brain and ventricles are symmetrical. There is no evidence  of hemorrhage, hydrocephalus or of acute infarction. Mild vascular  calcification and  mild to moderate small vessel ischemic disease is  noted. Further evaluation could be performed with a MRI examination of  the brain as indicated.           Radiation dose reduction techniques were utilized, including automated  exposure modulation based on body size.     This report was finalized on 8/25/2024 8:05 PM by Dr. Ander Jo M.D  on Workstation: [id number]       XR Chest 1 View [[id number]] Collected: 08/25/24 1932     Updated: 08/25/24 1936    Narrative:      XR CHEST 1 VW-     CLINICAL HISTORY:  ams     COMPARISON: 5/17/2022     FINDINGS: A single portable view of the chest demonstrates moderate  rotatory levoscoliosis of the thoracolumbar region. The heart is within  normal limits in size. There is no evidence of infiltrate, effusion or  of congestive failure.           This report was finalized on 8/25/2024 7:32 PM by Dr. Ander Jo M.D  on Workstation: [id number]             Scan on 8/25/2024 1840 by [employer], [place]: ECG 12-LEAD         Author: -- Service: -- Author Type: --   Filed: Date of Service: Creation Time:   Status: (Other)   HEART RATE=86  bpm  RR Inobvhwt=109  ms  AK Ihlkhqwa=266  ms  P Horizontal Axis=24  deg  P Front Axis=67  deg  QRSD Uxnuammf=802  ms  QT Fyqvltsw=142  ms  SNtC=387  ms  QRS Axis=-42  deg  T Wave Axis=59  deg  - ABNORMAL ECG -  Sinus rhythm  Incomplete RBBB and LAFB  RSR' in V1 or V2, right VCD or RVH  ST elevation, consider inferior injury          Current Facility-Administered Medications:     amLODIPine (NORVASC) tablet 10 mg, 10 mg, Oral, Daily, Calin Starkey MD, 10 mg at 08/26/24 0806    aspirin chewable tablet 81 mg, 81 mg, Oral, Daily, Calin Starkey MD, 81 mg at 08/26/24 0807    escitalopram (LEXAPRO) tablet 10 mg, 10 mg, Oral, Nightly, Calin Starkey MD, 10 mg at 08/26/24 0045    famotidine (PEPCID) tablet 20 mg, 20 mg, Oral, BID, Calin Starkey MD, 20 mg at 08/26/24 0807    folic acid (FOLVITE) tablet 1 mg, 1 mg, Oral, Daily, Clain Starkey MD, 1 mg  at 08/26/24 0807    LORazepam (ATIVAN) tablet 0.5 mg, 0.5 mg, Oral, Q4H PRN, Sofya Starkey MD    metoprolol tartrate (LOPRESSOR) tablet 12.5 mg, 12.5 mg, Oral, Q12H, Sofya Starkey MD, 12.5 mg at 08/26/24 0807    multivitamin (THERAGRAN) tablet 1 tablet, 1 tablet, Oral, Daily, Sofya Starkey MD, 1 tablet at 08/26/24 0807    [COMPLETED] Insert Peripheral IV, , , Once **AND** sodium chloride 0.9 % flush 10 mL, 10 mL, Intravenous, PRN, Newton Johnson II, MD    sodium chloride 0.9 % with KCl 20 mEq/L infusion, 75 mL/hr, Intravenous, Continuous, Sofya Starkey MD, Last Rate: 75 mL/hr at 08/26/24 1209, 75 mL/hr at 08/26/24 1209    thiamine (VITAMIN B-1) tablet 200 mg, 200 mg, Oral, Daily, Sofya Starkey MD, 200 mg at 08/26/24 0808     ASSESSMENT  Alcohol intoxication  Metabolic encephalopathy  Hypertension  Hyperlipidemia  Anxiety disorder  Depression  Chronic anemia  Gastroesophageal reflux disease    PLAN  Admit  IVF  Detox medications  CIWA protocol if needed  Continue home medications  Stress ulcer DVT prophylaxis  Access center to follow patient  Supportive care  PT OT  Patient is full code  Discussed with nursing staff  Follow closely further recommendation current hospital course    SOFYA STARKEY MD

## 2024-08-26 NOTE — ED NOTES
Nursing report ED to floor  Grazyna Fritz  69 y.o.  female    HPI :  HPI (Adult)  Stated Reason for Visit: family reports gradual worsening confusion x 2 wks, worse x 2 days.  sleeping a lot, increased anxiety.  drove to unknown place today.  History Obtained From: patient, family    Chief Complaint  Chief Complaint   Patient presents with    Anxiety    Altered Mental Status       Admitting doctor:   Calin Starkey MD    Admitting diagnosis:   The primary encounter diagnosis was Alcoholic intoxication without complication. A diagnosis of Altered mental status, unspecified altered mental status type was also pertinent to this visit.    Code status:   Current Code Status       Date Active Code Status Order ID Comments User Context       Prior            Allergies:   Patient has no known allergies.    Isolation:   No active isolations    Intake and Output    Intake/Output Summary (Last 24 hours) at 8/25/2024 2144  Last data filed at 8/25/2024 2109  Gross per 24 hour   Intake 100 ml   Output --   Net 100 ml       Weight:       08/25/24  1829   Weight: 54.4 kg (120 lb)       Most recent vitals:   Vitals:    08/25/24 1938 08/25/24 1940 08/25/24 1945 08/25/24 2126   BP:    163/83   BP Location:       Patient Position:       Pulse: 85 94 94 107   Resp:       Temp:       TempSrc:       SpO2: 99% 99% 99% 99%   Weight:       Height:           Active LDAs/IV Access:   Lines, Drains & Airways       Active LDAs       Name Placement date Placement time Site Days    Peripheral IV 08/25/24 1839 Anterior;Right Forearm 08/25/24 1839  Forearm  less than 1                    Labs (abnormal labs have a star):   Labs Reviewed   COMPREHENSIVE METABOLIC PANEL - Abnormal; Notable for the following components:       Result Value    Glucose 109 (*)     Sodium 134 (*)     Chloride 95 (*)     AST (SGOT) 38 (*)     All other components within normal limits    Narrative:     GFR Normal >60  Chronic Kidney Disease <60  Kidney Failure <15    The  GFR formula is only valid for adults with stable renal function between ages 18 and 70.   CBC WITH AUTO DIFFERENTIAL - Abnormal; Notable for the following components:    RBC 3.11 (*)     Hemoglobin 10.2 (*)     Hematocrit 31.9 (*)     .6 (*)     Eosinophil % 0.2 (*)     All other components within normal limits   ETHANOL - Abnormal; Notable for the following components:    Ethanol 179 (*)     All other components within normal limits   TROPONIN - Normal    Narrative:     High Sensitive Troponin T Reference Range:  <14.0 ng/L- Negative Female for AMI  <22.0 ng/L- Negative Male for AMI  >=14 - Abnormal Female indicating possible myocardial injury.  >=22 - Abnormal Male indicating possible myocardial injury.   Clinicians would have to utilize clinical acumen, EKG, Troponin, and serial changes to determine if it is an Acute Myocardial Infarction or myocardial injury due to an underlying chronic condition.        URINE DRUG SCREEN - Normal    Narrative:     Negative Thresholds Per Drugs Screened:    Amphetamines                 500 ng/ml  Barbiturates                 200 ng/ml  Benzodiazepines              100 ng/ml  Cocaine                      300 ng/ml  Methadone                    300 ng/ml  Opiates                      300 ng/ml  Oxycodone                    100 ng/ml  THC                           50 ng/ml  Fentanyl                       5 ng/ml      The Normal Value for all drugs tested is negative. This report includes final unconfirmed screening results to be used for medical treatment purposes only. Unconfirmed results must not be used for non-medical purposes such as employment or legal testing. Clinical consideration should be applied to any drug of abuse test, particularly when unconfirmed results are used.           ACETAMINOPHEN LEVEL - Normal   SALICYLATE LEVEL - Normal    Narrative:     Therapeutic range for Salicylates:  3.0 - 10.0 mg/dL for antipyretic/analgesic conditions  15.0 - 30.0 mg/dL for  anti-inflammatory conditions   CK - Normal   MAGNESIUM - Normal   PHOSPHORUS - Normal   TSH - Normal   HIGH SENSITIVITIY TROPONIN T 2HR   CBC AND DIFFERENTIAL    Narrative:     The following orders were created for panel order CBC & Differential.  Procedure                               Abnormality         Status                     ---------                               -----------         ------                     CBC Auto Differential[[id number]]        Abnormal            Final result                 Please view results for these tests on the individual orders.       EKG:   ECG 12 Lead Tachycardia   Preliminary Result   HEART RATE=86  bpm   RR Jjlxdjeu=581  ms   KS Gmbmccwr=897  ms   P Horizontal Axis=24  deg   P Front Axis=67  deg   QRSD Hmcadzbm=297  ms   QT Ljnzyxjh=587  ms   GDwM=379  ms   QRS Axis=-42  deg   T Wave Axis=59  deg   - ABNORMAL ECG -   Sinus rhythm   Incomplete RBBB and LAFB   RSR' in V1 or V2, right VCD or RVH   ST elevation, consider inferior injury   Date and Time of Study:2024-08-25 18:38:48      ECG 12 Lead Altered Mental Status    (Results Pending)       Meds given in ED:   Medications   sodium chloride 0.9 % flush 10 mL (has no administration in time range)   thiamine (B-1) 500 mg in sodium chloride 0.9 % 100 mL IVPB (0 mg Intravenous Stopped 8/25/24 2109)       Imaging results:  No radiology results for the last day    Ambulatory status:   - up with assist     Social issues:   Social History     Socioeconomic History    Marital status:    Tobacco Use    Smoking status: Never    Smokeless tobacco: Never   Vaping Use    Vaping status: Never Used   Substance and Sexual Activity    Alcohol use: No    Drug use: No    Sexual activity: Defer       Peripheral Neurovascular  Peripheral Neurovascular (Adult)  Peripheral Neurovascular WDL: WDL    Neuro Cognitive  Neuro Cognitive (Adult)  Cognitive/Neuro/Behavioral WDL: .WDL except    Learning  Learning Assessment (Adult)  Learning  Readiness and Ability: no barriers identified  Individualized Teaching Method: son at bedside  Education Provided  Person Taught: family member/friend    Respiratory  Respiratory WDL  Respiratory WDL: WDL    Abdominal Pain       Pain Assessments  Pain (Adult)  (0-10) Pain Rating: Rest: 5  Pain Location: knee  Pain Side/Orientation: right    NIH Stroke Scale       Alvaro Barahona RN  08/25/24 21:44 EDT
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
## 2024-08-26 NOTE — NURSING NOTE
Son visiting at bedside.  Expresses interest in obtaining resources for further treatment and workup after discharge.  Mentions counseling, psychologist/psychiatrist referral for assessment and medication management and alcohol use.  Also interested in referral or recommendation for a memory/ dementia workup.  Access center contacted, took message, and is to call back.      Maira RAPHAEL

## 2024-08-26 NOTE — PLAN OF CARE
Goal Outcome Evaluation:  Plan of Care Reviewed With: patient        Progress: improving  Outcome Evaluation: reported anxiety this am but better this afternoon after prn ativan PO.  AOx4 but forgetful.  cooperative.  evaluated by access.   visited.  bed/chair alarm activated for safety.  CIWA scores 2-3 this shift.  IVF's infusing.

## 2024-08-26 NOTE — ED PROVIDER NOTES
EMERGENCY DEPARTMENT ENCOUNTER    Room Number:    PCP: Milton Rios MD    HPI:  Chief Complaint: Altered mental status  A complete HPI/ROS/PMH/PSH/SH/FH are unobtainable due to: None  Context: Grazyna Fritz is a 69 y.o. female who presents to the ED c/o acute altered mental status.  This been ongoing for about 6 months.  She has had worsening symptoms ever since her  .  Today family notes that she is been confused to the time of day, and she was also asking repetitive questions.  For example she was asking 5 times what they were going to do with the tomato.  She has had worsening symptoms over the last 2 days which is why they brought her to the ER for evaluation.  She denies any infectious symptoms on review of systems.        PAST MEDICAL HISTORY  Active Ambulatory Problems     Diagnosis Date Noted    Osteoarthritis of left foot 2017    Screening for colon cancer 10/17/2019    Subcapital fracture of hip, left, closed, initial encounter 2022     Resolved Ambulatory Problems     Diagnosis Date Noted    No Resolved Ambulatory Problems     Past Medical History:   Diagnosis Date    Anxiety and depression     Arthritis     Basal cell carcinoma of skin     Cataract     Elevated cholesterol     GERD (gastroesophageal reflux disease)     Hypertension     Right knee pain     Seasonal allergies     Urge incontinence          PAST SURGICAL HISTORY  Past Surgical History:   Procedure Laterality Date    BASAL CELL CARCINOMA EXCISION  2001    FACE    BUNIONECTOMY Right 2014    COLONOSCOPY N/A     [facility]    COLONOSCOPY N/A 2019    Procedure: COLONOSCOPY;  Surgeon: Berkley Herring MD;  Location: [facility] ENDOSCOPY;  Service: General    CYST REMOVAL N/A 2018    In-office procedure: Excision of 2.0 sebaceous cyst of the left midback, Dr. Berkley Herring    FOOT FUSION Left 2017    Procedure: LT TIBIAL CALCANEAL & MID FOOT OSTEOTOMIES W/ BONE GRAFT LT  PERONEUS BREVIS LONGUS TENDON TRANSFER TENDO ACHILLES LENGTHENING ;  Surgeon: Ok Ferrari MD;  Location:  [facility];  Service:     KNEE ARTHROSCOPY Right 6/16/2020    Procedure: RIGHT KNEE ARTHROSCOPY PARTIAL MEDIAL MENISECTOMY SUBCHONDROPLASTY MEDIAL TIBIAL PLATEAU;  Surgeon: Nicolas Álvarez MD;  Location:  [facility];  Service: Orthopedics;  Laterality: Right;    LAPAROSCOPIC CHOLECYSTECTOMY W/ CHOLANGIOGRAPHY N/A 05/11/2006    Dr. Toby Woo    TOTAL HIP ARTHROPLASTY Left 5/19/2022    Procedure: TOTAL HIP ARTHROPLASTY ANTERIOR WITH HANA TABLE;  Surgeon: Dav Adair II, MD;  Location: [facility] MAIN OR;  Service: Orthopedics;  Laterality: Left;    TUBAL ABDOMINAL LIGATION Bilateral          FAMILY HISTORY  Family History   Problem Relation Age of Onset    Lung cancer Father     Cervical cancer Sister     Malig Hyperthermia Neg Hx          SOCIAL HISTORY  Social History     Socioeconomic History    Marital status:    Tobacco Use    Smoking status: Never    Smokeless tobacco: Never   Vaping Use    Vaping status: Never Used   Substance and Sexual Activity    Alcohol use: No    Drug use: No    Sexual activity: Defer         ALLERGIES  Patient has no known allergies.        REVIEW OF SYSTEMS  Review of Systems     Included in HPI  All systems reviewed and negative except for those discussed in HPI.       PHYSICAL EXAM  ED Triage Vitals   Temp Heart Rate Resp BP SpO2   08/25/24 1820 08/25/24 1820 08/25/24 1820 08/25/24 1829 08/25/24 1820   98.8 °F (37.1 °C) (!) 124 16 162/87 98 %      Temp src Heart Rate Source Patient Position BP Location FiO2 (%)   08/25/24 1820 -- 08/25/24 1829 08/25/24 1829 --   Tympanic  Lying Right arm        Physical Exam      GENERAL: no acute distress  HENT: nares patent  EYES: no scleral icterus  CV: regular rhythm, normal rate  RESPIRATORY: normal effort, clear to auscultation bilaterally  ABDOMEN: soft, nontender  MUSCULOSKELETAL: no deformity  NEURO:    Recent and remote memory functions are normal. The patient is attentive with normal concentration. Language is fluent. Speech is clear. The speech is non-dysarthric. Fund of knowledge is normal.   Symmetric smile with no facial droop.  Eyes close shut strongly bilaterally.  Symmetric eyebrow raise bilaterally.  EOMI, PERRL  CN II-XII grossly normal otherwise.  5/5 strength to extremities.  No pronator drift.  Intact FNF.  No meningismus.  Unsteady gait  PSYCH:  calm, cooperative  SKIN: warm, dry    Vital signs and nursing notes reviewed.          LAB RESULTS  Recent Results (from the past 24 hour(s))   ECG 12 Lead Tachycardia    Collection Time: 08/25/24  6:38 PM   Result Value Ref Range    QT Interval 372 ms    QTC Interval 445 ms   Comprehensive Metabolic Panel    Collection Time: 08/25/24  6:41 PM    Specimen: Blood   Result Value Ref Range    Glucose 109 (H) 65 - 99 mg/dL    BUN 10 8 - 23 mg/dL    Creatinine 0.64 0.57 - 1.00 mg/dL    Sodium 134 (L) 136 - 145 mmol/L    Potassium 3.5 3.5 - 5.2 mmol/L    Chloride 95 (L) 98 - 107 mmol/L    CO2 26.0 22.0 - 29.0 mmol/L    Calcium 9.0 8.6 - 10.5 mg/dL    Total Protein 6.3 6.0 - 8.5 g/dL    Albumin 4.4 3.5 - 5.2 g/dL    ALT (SGPT) 24 1 - 33 U/L    AST (SGOT) 38 (H) 1 - 32 U/L    Alkaline Phosphatase 60 39 - 117 U/L    Total Bilirubin 0.2 0.0 - 1.2 mg/dL    Globulin 1.9 gm/dL    A/G Ratio 2.3 g/dL    BUN/Creatinine Ratio 15.6 7.0 - 25.0    Anion Gap 13.0 5.0 - 15.0 mmol/L    eGFR 94.6 >60.0 mL/min/1.73   CBC Auto Differential    Collection Time: 08/25/24  6:41 PM    Specimen: Blood   Result Value Ref Range    WBC 4.66 3.40 - 10.80 10*3/mm3    RBC 3.11 (L) 3.77 - 5.28 10*6/mm3    Hemoglobin 10.2 (L) 12.0 - 15.9 g/dL    Hematocrit 31.9 (L) 34.0 - 46.6 %    .6 (H) 79.0 - 97.0 fL    MCH 32.8 26.6 - 33.0 pg    MCHC 32.0 31.5 - 35.7 g/dL    RDW 13.9 12.3 - 15.4 %    RDW-SD 52.2 37.0 - 54.0 fl    MPV 9.9 6.0 - 12.0 fL    Platelets 239 140 - 450 10*3/mm3    Neutrophil %  70.7 42.7 - 76.0 %    Lymphocyte % 20.6 19.6 - 45.3 %    Monocyte % 7.9 5.0 - 12.0 %    Eosinophil % 0.2 (L) 0.3 - 6.2 %    Basophil % 0.4 0.0 - 1.5 %    Immature Grans % 0.2 0.0 - 0.5 %    Neutrophils, Absolute 3.29 1.70 - 7.00 10*3/mm3    Lymphocytes, Absolute 0.96 0.70 - 3.10 10*3/mm3    Monocytes, Absolute 0.37 0.10 - 0.90 10*3/mm3    Eosinophils, Absolute 0.01 0.00 - 0.40 10*3/mm3    Basophils, Absolute 0.02 0.00 - 0.20 10*3/mm3    Immature Grans, Absolute 0.01 0.00 - 0.05 10*3/mm3    nRBC 0.0 0.0 - 0.2 /100 WBC   High Sensitivity Troponin T    Collection Time: 08/25/24  6:41 PM    Specimen: Blood   Result Value Ref Range    HS Troponin T 10 <14 ng/L   Ethanol    Collection Time: 08/25/24  6:41 PM    Specimen: Blood   Result Value Ref Range    Ethanol 179 (H) 0 - 10 mg/dL    Ethanol % 0.179 %   Acetaminophen Level    Collection Time: 08/25/24  6:41 PM    Specimen: Blood   Result Value Ref Range    Acetaminophen 10.0 0.0 - 30.0 mcg/mL   Salicylate Level    Collection Time: 08/25/24  6:41 PM    Specimen: Blood   Result Value Ref Range    Salicylate <0.3 <=30.0 mg/dL   CK    Collection Time: 08/25/24  6:41 PM    Specimen: Blood   Result Value Ref Range    Creatine Kinase 121 20 - 180 U/L   Magnesium    Collection Time: 08/25/24  6:41 PM    Specimen: Blood   Result Value Ref Range    Magnesium 2.0 1.6 - 2.4 mg/dL   Phosphorus    Collection Time: 08/25/24  6:41 PM    Specimen: Blood   Result Value Ref Range    Phosphorus 3.7 2.5 - 4.5 mg/dL   TSH    Collection Time: 08/25/24  6:41 PM    Specimen: Blood   Result Value Ref Range    TSH 0.879 0.270 - 4.200 uIU/mL   Urine Drug Screen - Urine, Clean Catch    Collection Time: 08/25/24  7:25 PM    Specimen: Urine, Clean Catch   Result Value Ref Range    Amphet/Methamphet, Screen Negative Negative    Barbiturates Screen, Urine Negative Negative    Benzodiazepine Screen, Urine Negative Negative    Cocaine Screen, Urine Negative Negative    Opiate Screen Negative Negative     THC, Screen, Urine Negative Negative    Methadone Screen, Urine Negative Negative    Oxycodone Screen, Urine Negative Negative    Fentanyl, Urine Negative Negative       Ordered the above labs and reviewed the results.        RADIOLOGY  CT Head Without Contrast    Result Date: 8/25/2024  CT HEAD WO CONTRAST-  HISTORY:  ams  COMPARISON: None  FINDINGS: The brain and ventricles are symmetrical. There is no evidence of hemorrhage, hydrocephalus or of acute infarction. Mild vascular calcification and mild to moderate small vessel ischemic disease is noted. Further evaluation could be performed with a MRI examination of the brain as indicated.    Radiation dose reduction techniques were utilized, including automated exposure modulation based on body size.  This report was finalized on 8/25/2024 8:05 PM by Dr. Ander Jo M.D on Workstation: [id number]      XR Chest 1 View    Result Date: 8/25/2024  XR CHEST 1 VW-  CLINICAL HISTORY:  ams  COMPARISON: 5/17/2022  FINDINGS: A single portable view of the chest demonstrates moderate rotatory levoscoliosis of the thoracolumbar region. The heart is within normal limits in size. There is no evidence of infiltrate, effusion or of congestive failure.    This report was finalized on 8/25/2024 7:32 PM by Dr. nAder Jo M.D on Workstation: [id number]       Ordered the above noted radiological studies. Reviewed by me in PACS.        MEDICATIONS GIVEN IN ER  Medications   sodium chloride 0.9 % flush 10 mL (has no administration in time range)   thiamine (B-1) 500 mg in sodium chloride 0.9 % 100 mL IVPB (0 mg Intravenous Stopped 8/25/24 2109)         ORDERS PLACED DURING THIS VISIT:  Orders Placed This Encounter   Procedures    CT Head Without Contrast    XR Chest 1 View    Comprehensive Metabolic Panel    CBC Auto Differential    High Sensitivity Troponin T    Ethanol    Urine Drug Screen - Urine, Clean Catch    Acetaminophen Level    Salicylate Level    CK    Magnesium     Phosphorus    TSH    High Sensitivity Troponin T 2Hr    Monitor Blood Pressure    Monitor Blood Pressure    Continuous Pulse Oximetry    ECG 12 Lead Tachycardia    ECG 12 Lead Altered Mental Status    Insert Peripheral IV    CBC & Differential         OUTPATIENT MEDICATION MANAGEMENT:  Current Facility-Administered Medications Ordered in Epic   Medication Dose Route Frequency Provider Last Rate Last Admin    sodium chloride 0.9 % flush 10 mL  10 mL Intravenous PRN Newton Johnson II, MD         Current Outpatient Medications Ordered in Epic   Medication Sig Dispense Refill    LORazepam (ATIVAN) 0.5 MG tablet Take 0.5-1 tablets by mouth Every 8 (Eight) Hours As Needed for Anxiety.      escitalopram (LEXAPRO) 10 MG tablet Take 10 mg by mouth Every Evening.      HYDROcodone-acetaminophen (NORCO) 5-325 MG per tablet Take 1 tablet by mouth 1 (One) Time As Needed for Moderate Pain  for up to 7 doses. 10 tablet 0    losartan (COZAAR) 50 MG tablet Take 1 tablet by mouth Every Night for 30 days. 30 tablet 0    omeprazole (priLOSEC) 20 MG capsule Take 20 mg by mouth Daily As Needed.         PROCEDURES  Procedures          MEDICAL DECISION MAKING, PROGRESS, and CONSULTS    Discussion below represents my analysis of pertinent findings related to patient's condition, differential diagnosis, treatment plan and final disposition.      Additional sources:  - Discussed/obtained information from independent historians: Family at bedside  Additional information was obtained to confirm the patient's history.        Differential diagnosis:    Differential diagnosis for altered mental status includes:  - vital sign abnormalities such as HTN encephalopathy, hypotension, hypoxemia, hypercarbia, heat stroke  - toxic/metabolic pathology such as hypoglycemia, DKA, hypo/hyper-natremia, thyroid storm, myxedema coma, medication side effect (either intentional or accidental)  - infectious etiology  - intracranial pathology such as stroke,  seizure, intracranial mass, intracranial hemorrhage  - psychiatric pathology               Independent interpretation of labs, radiology studies, and discussions with consultants:  ED Course as of 08/26/24 2229   Sun Aug 25, 2024   1931 EKG independently interpreted by myself.  Time 6:38 PM.  Sinus rhythm.  Heart rate 86.  LAFB.  Incomplete right bundle branch block.  No acute ST abnormality. [TD]   1938 Ethanol(!): 179  I confronted the patient about her elevated alcohol level.  She states that she had 1 drink today.  Her family knew about that 1 drink.  She states the last time she drank was 2 days ago.  She states that her alcohol level must be so high because she drank heavily 2 days ago. [TD]   2136 I discussed the case with Dr. Starkey, hospitalist.  He will admit. [TD]      ED Course User Index  [TD] Newton Johnson II, MD             DIAGNOSIS  Final diagnoses:   Alcoholic intoxication without complication   Altered mental status, unspecified altered mental status type         DISPOSITION  Admit      Latest Documented Vital Signs:  As of 21:22 EDT  BP- 149/85 HR- 94 Temp- 98.8 °F (37.1 °C) (Tympanic) O2 sat- 99%      --    Please note that portions of this were completed with a voice recognition program.       Note Disclaimer: At [facility], we believe that sharing information builds trust and better relationships. You are receiving this note because you are receiving care at [facility] or recently visited. It is possible you will see health information before a provider has talked with you about it. This kind of information can be easy to misunderstand. To help you fully understand what it means for your health, we urge you to discuss this note with your provider.         Newton Johnson II, MD  08/26/24 2229

## 2024-08-27 LAB
ALBUMIN SERPL-MCNC: 3.9 G/DL (ref 3.5–5.2)
ALBUMIN/GLOB SERPL: 2.3 G/DL
ALP SERPL-CCNC: 52 U/L (ref 39–117)
ALT SERPL W P-5'-P-CCNC: 18 U/L (ref 1–33)
ANION GAP SERPL CALCULATED.3IONS-SCNC: 6 MMOL/L (ref 5–15)
AST SERPL-CCNC: 22 U/L (ref 1–32)
BASOPHILS # BLD AUTO: 0.02 10*3/MM3 (ref 0–0.2)
BASOPHILS NFR BLD AUTO: 0.5 % (ref 0–1.5)
BILIRUB SERPL-MCNC: 0.5 MG/DL (ref 0–1.2)
BUN SERPL-MCNC: 8 MG/DL (ref 8–23)
BUN/CREAT SERPL: 16.3 (ref 7–25)
CALCIUM SPEC-SCNC: 8.7 MG/DL (ref 8.6–10.5)
CHLORIDE SERPL-SCNC: 104 MMOL/L (ref 98–107)
CHOLEST SERPL-MCNC: 188 MG/DL (ref 0–200)
CO2 SERPL-SCNC: 27 MMOL/L (ref 22–29)
CREAT SERPL-MCNC: 0.49 MG/DL (ref 0.57–1)
DEPRECATED RDW RBC AUTO: 50.7 FL (ref 37–54)
EGFRCR SERPLBLD CKD-EPI 2021: 100.9 ML/MIN/1.73
EOSINOPHIL # BLD AUTO: 0.02 10*3/MM3 (ref 0–0.4)
EOSINOPHIL NFR BLD AUTO: 0.5 % (ref 0.3–6.2)
ERYTHROCYTE [DISTWIDTH] IN BLOOD BY AUTOMATED COUNT: 13.4 % (ref 12.3–15.4)
GLOBULIN UR ELPH-MCNC: 1.7 GM/DL
GLUCOSE SERPL-MCNC: 111 MG/DL (ref 65–99)
HBA1C MFR BLD: <4.2 % (ref 4.8–5.6)
HCT VFR BLD AUTO: 30.1 % (ref 34–46.6)
HDLC SERPL-MCNC: 107 MG/DL (ref 40–60)
HGB BLD-MCNC: 9.8 G/DL (ref 12–15.9)
IMM GRANULOCYTES # BLD AUTO: 0.01 10*3/MM3 (ref 0–0.05)
IMM GRANULOCYTES NFR BLD AUTO: 0.2 % (ref 0–0.5)
LDLC SERPL CALC-MCNC: 68 MG/DL (ref 0–100)
LDLC/HDLC SERPL: 0.63 {RATIO}
LYMPHOCYTES # BLD AUTO: 0.59 10*3/MM3 (ref 0.7–3.1)
LYMPHOCYTES NFR BLD AUTO: 13.4 % (ref 19.6–45.3)
MCH RBC QN AUTO: 33.3 PG (ref 26.6–33)
MCHC RBC AUTO-ENTMCNC: 32.6 G/DL (ref 31.5–35.7)
MCV RBC AUTO: 102.4 FL (ref 79–97)
MONOCYTES # BLD AUTO: 0.56 10*3/MM3 (ref 0.1–0.9)
MONOCYTES NFR BLD AUTO: 12.7 % (ref 5–12)
NEUTROPHILS NFR BLD AUTO: 3.2 10*3/MM3 (ref 1.7–7)
NEUTROPHILS NFR BLD AUTO: 72.7 % (ref 42.7–76)
NRBC BLD AUTO-RTO: 0 /100 WBC (ref 0–0.2)
NT-PROBNP SERPL-MCNC: 432 PG/ML (ref 0–900)
PLATELET # BLD AUTO: 182 10*3/MM3 (ref 140–450)
PMV BLD AUTO: 10.9 FL (ref 6–12)
POTASSIUM SERPL-SCNC: 4.1 MMOL/L (ref 3.5–5.2)
PROT SERPL-MCNC: 5.6 G/DL (ref 6–8.5)
QT INTERVAL: 372 MS
QTC INTERVAL: 445 MS
RBC # BLD AUTO: 2.94 10*6/MM3 (ref 3.77–5.28)
SODIUM SERPL-SCNC: 137 MMOL/L (ref 136–145)
TRIGL SERPL-MCNC: 68 MG/DL (ref 0–150)
TSH SERPL DL<=0.05 MIU/L-ACNC: 0.97 UIU/ML (ref 0.27–4.2)
VLDLC SERPL-MCNC: 13 MG/DL (ref 5–40)
WBC NRBC COR # BLD AUTO: 4.4 10*3/MM3 (ref 3.4–10.8)

## 2024-08-27 PROCEDURE — 85025 COMPLETE CBC W/AUTO DIFF WBC: CPT | Performed by: HOSPITALIST

## 2024-08-27 PROCEDURE — 80061 LIPID PANEL: CPT | Performed by: HOSPITALIST

## 2024-08-27 PROCEDURE — 84443 ASSAY THYROID STIM HORMONE: CPT | Performed by: HOSPITALIST

## 2024-08-27 PROCEDURE — 25010000002 SODIUM CHLORIDE 0.9 % WITH KCL 20 MEQ 20-0.9 MEQ/L-% SOLUTION: Performed by: HOSPITALIST

## 2024-08-27 PROCEDURE — 97165 OT EVAL LOW COMPLEX 30 MIN: CPT

## 2024-08-27 PROCEDURE — 97161 PT EVAL LOW COMPLEX 20 MIN: CPT

## 2024-08-27 PROCEDURE — 97530 THERAPEUTIC ACTIVITIES: CPT

## 2024-08-27 PROCEDURE — 83880 ASSAY OF NATRIURETIC PEPTIDE: CPT | Performed by: HOSPITALIST

## 2024-08-27 PROCEDURE — 80053 COMPREHEN METABOLIC PANEL: CPT | Performed by: HOSPITALIST

## 2024-08-27 PROCEDURE — 83036 HEMOGLOBIN GLYCOSYLATED A1C: CPT | Performed by: HOSPITALIST

## 2024-08-27 RX ORDER — POLYETHYLENE GLYCOL 3350 17 G/17G
17 POWDER, FOR SOLUTION ORAL DAILY
Status: DISCONTINUED | OUTPATIENT
Start: 2024-08-27 | End: 2024-08-28 | Stop reason: HOSPADM

## 2024-08-27 RX ORDER — DOCUSATE SODIUM 100 MG/1
100 CAPSULE, LIQUID FILLED ORAL 2 TIMES DAILY
Status: DISCONTINUED | OUTPATIENT
Start: 2024-08-27 | End: 2024-08-28 | Stop reason: HOSPADM

## 2024-08-27 RX ORDER — ACETAMINOPHEN 325 MG/1
650 TABLET ORAL EVERY 4 HOURS PRN
Status: DISCONTINUED | OUTPATIENT
Start: 2024-08-27 | End: 2024-08-28 | Stop reason: HOSPADM

## 2024-08-27 RX ORDER — CETIRIZINE HYDROCHLORIDE 10 MG/1
10 TABLET ORAL DAILY
Status: DISCONTINUED | OUTPATIENT
Start: 2024-08-27 | End: 2024-08-28 | Stop reason: HOSPADM

## 2024-08-27 RX ORDER — BISACODYL 10 MG
10 SUPPOSITORY, RECTAL RECTAL DAILY PRN
Status: DISCONTINUED | OUTPATIENT
Start: 2024-08-27 | End: 2024-08-28 | Stop reason: HOSPADM

## 2024-08-27 RX ADMIN — ESCITALOPRAM OXALATE 10 MG: 10 TABLET, FILM COATED ORAL at 20:17

## 2024-08-27 RX ADMIN — Medication 1 TABLET: at 08:35

## 2024-08-27 RX ADMIN — POLYETHYLENE GLYCOL 3350 17 G: 17 POWDER, FOR SOLUTION ORAL at 14:01

## 2024-08-27 RX ADMIN — LORAZEPAM 0.5 MG: 0.5 TABLET ORAL at 23:43

## 2024-08-27 RX ADMIN — CETIRIZINE HYDROCHLORIDE 10 MG: 10 TABLET ORAL at 15:01

## 2024-08-27 RX ADMIN — ACETAMINOPHEN 325MG 650 MG: 325 TABLET ORAL at 20:15

## 2024-08-27 RX ADMIN — METOPROLOL TARTRATE 12.5 MG: 25 TABLET, FILM COATED ORAL at 20:15

## 2024-08-27 RX ADMIN — LORAZEPAM 0.5 MG: 0.5 TABLET ORAL at 18:59

## 2024-08-27 RX ADMIN — ASPIRIN 81 MG: 81 TABLET, CHEWABLE ORAL at 08:35

## 2024-08-27 RX ADMIN — METOPROLOL TARTRATE 12.5 MG: 25 TABLET, FILM COATED ORAL at 08:35

## 2024-08-27 RX ADMIN — POTASSIUM CHLORIDE AND SODIUM CHLORIDE 75 ML/HR: 900; 150 INJECTION, SOLUTION INTRAVENOUS at 11:51

## 2024-08-27 RX ADMIN — Medication 200 MG: at 08:35

## 2024-08-27 RX ADMIN — FAMOTIDINE 20 MG: 20 TABLET, FILM COATED ORAL at 08:35

## 2024-08-27 RX ADMIN — FOLIC ACID 1 MG: 1 TABLET ORAL at 08:35

## 2024-08-27 RX ADMIN — AMLODIPINE BESYLATE 10 MG: 10 TABLET ORAL at 08:34

## 2024-08-27 RX ADMIN — ACETAMINOPHEN 325MG 650 MG: 325 TABLET ORAL at 10:43

## 2024-08-27 RX ADMIN — FAMOTIDINE 20 MG: 20 TABLET, FILM COATED ORAL at 20:15

## 2024-08-27 RX ADMIN — DOCUSATE SODIUM 100 MG: 100 CAPSULE, LIQUID FILLED ORAL at 20:15

## 2024-08-27 NOTE — THERAPY EVALUATION
Patient Name: Grazyna Fritz  : 1955    MRN: [id number]                              Today's Date: 2024       Admit Date: 2024    Visit Dx:     ICD-10-CM ICD-9-CM   1. Alcoholic intoxication without complication  F10.920 305.00   2. Altered mental status, unspecified altered mental status type  R41.82 780.97     Patient Active Problem List   Diagnosis    Osteoarthritis of left foot    Screening for colon cancer    Subcapital fracture of hip, left, closed, initial encounter    Alcohol intoxication     Past Medical History:   Diagnosis Date    Anxiety and depression     Arthritis     OSTEO. HANDS    Basal cell carcinoma of skin     REMOVED FROM FACE    Cataract     Elevated cholesterol     GERD (gastroesophageal reflux disease)     Hypertension     Right knee pain     TORN MENISCUS    Seasonal allergies     Urge incontinence      Past Surgical History:   Procedure Laterality Date    BASAL CELL CARCINOMA EXCISION      FACE    BUNIONECTOMY Right 2014    COLONOSCOPY N/A     [facility]    COLONOSCOPY N/A 2019    Procedure: COLONOSCOPY;  Surgeon: Berkley Herring MD;  Location: [facility] ENDOSCOPY;  Service: General    CYST REMOVAL N/A 2018    In-office procedure: Excision of 2.0 sebaceous cyst of the left midback, Dr. Berkley Herring    FOOT FUSION Left 2017    Procedure: LT TIBIAL CALCANEAL & MID FOOT OSTEOTOMIES W/ BONE GRAFT LT PERONEUS BREVIS LONGUS TENDON TRANSFER TENDO ACHILLES LENGTHENING ;  Surgeon: Ok Ferrari MD;  Location: [facility] OR [place];  Service:     KNEE ARTHROSCOPY Right 2020    Procedure: RIGHT KNEE ARTHROSCOPY PARTIAL MEDIAL MENISECTOMY SUBCHONDROPLASTY MEDIAL TIBIAL PLATEAU;  Surgeon: Nicolas Álvarez MD;  Location: [facility] OR [place];  Service: Orthopedics;  Laterality: Right;    LAPAROSCOPIC CHOLECYSTECTOMY W/ CHOLANGIOGRAPHY N/A 2006    Dr. Toby Woo    TOTAL HIP ARTHROPLASTY Left 2022    Procedure: TOTAL HIP ARTHROPLASTY  ANTERIOR WITH HANA TABLE;  Surgeon: Dav Adair II, MD;  Location: [facility] MAIN OR;  Service: Orthopedics;  Laterality: Left;    TUBAL ABDOMINAL LIGATION Bilateral       General Information       Row Name 08/27/24 1332          OT Time and Intention    Document Type evaluation  -KR     Mode of Treatment individual therapy;occupational therapy  -KR       Row Name 08/27/24 1332          General Information    Patient Profile Reviewed yes  -KR     Prior Level of Function independent:;ADL's  -KR     Existing Precautions/Restrictions fall  -KR       Row Name 08/27/24 1332          Living Environment    People in Home alone  -KR       Row Name 08/27/24 1332          Home Main Entrance    Number of Stairs, Main Entrance five  -KR     Stair Railings, Main Entrance railings safe and in good condition  -KR       Row Name 08/27/24 1332          Cognition    Orientation Status (Cognition) oriented x 3  -KR       Row Name 08/27/24 1332          Safety Issues, Functional Mobility    Impairments Affecting Function (Mobility) cognition  -KR               User Key  (r) = Recorded By, (t) = Taken By, (c) = Cosigned By      Initials Name Provider Type    KR Morenita Rice, OT Occupational Therapist                     Mobility/ADL's       Row Name 08/27/24 1333          Bed Mobility    Bed Mobility supine-sit  -KR     Supine-Sit [place] (Bed Mobility) supervision  -KR     Comment, (Bed Mobility) UIC at the end of session  -KR       Row Name 08/27/24 1333          Transfers    Transfers sit-stand transfer  -KR       Row Name 08/27/24 1333          Sit-Stand Transfer    Sit-Stand [place] (Transfers) standby assist  -KR     Comment, (Sit-Stand Transfer) no AD  -KR       Row Name 08/27/24 1333          Functional Mobility    Functional Mobility- Ind. Level standby assist;contact guard assist  -KR       Row Name 08/27/24 1333          Activities of Daily Living    BADL Assessment/Intervention lower body  dressing;toileting  -KR       Row Name 08/27/24 1333          Lower Body Dressing Assessment/Training    [place] Level (Lower Body Dressing) lower body dressing skills;doff;don;supervision;socks  -KR       Row Name 08/27/24 1333          Toileting Assessment/Training    Comment, (Toileting) reports has been performing with supervision  -KR               User Key  (r) = Recorded By, (t) = Taken By, (c) = Cosigned By      Initials Name Provider Type    Morenita Rosenbaum OT Occupational Therapist                   Obj/Interventions       Row Name 08/27/24 1334          Range of Motion Comprehensive    General Range of Motion bilateral upper extremity ROM WFL  -KR       Row Name 08/27/24 1334          Strength Comprehensive (MMT)    General Manual Muscle Testing (MMT) Assessment upper extremity strength deficits identified  -KR       Row Name 08/27/24 1334          Balance    Balance Assessment sitting static balance;sitting dynamic balance;standing static balance;standing dynamic balance  -KR     Static Sitting Balance supervision  -KR     Dynamic Sitting Balance standby assist  -KR     Static Standing Balance standby assist  -KR     Dynamic Standing Balance contact guard  -KR     Position/Device Used, Standing Balance unsupported  no AD  -KR     Balance Interventions standing;sitting  -KR               User Key  (r) = Recorded By, (t) = Taken By, (c) = Cosigned By      Initials Name Provider Type    Morenita Rosenbaum OT Occupational Therapist                   Goals/Plan       Row Name 08/27/24 1335          Transfer Goal 1 (OT)    Activity/Assistive Device (Transfer Goal 1, OT) transfers, all  -KR     [place] Level/Cues Needed (Transfer Goal 1, OT) standby assist  -KR     Progress/Outcome (Transfer Goal 1, OT) goal met  -KR               User Key  (r) = Recorded By, (t) = Taken By, (c) = Cosigned By      Initials Name Provider Type    Morenita Rosenbaum OT Occupational Therapist                   Clinical  Impression       Row Name 08/27/24 4332          Pain Assessment    Pretreatment Pain Rating 0/10 - no pain  -KR     Posttreatment Pain Rating 0/10 - no pain  -KR       Row Name 08/27/24 2809          Plan of Care Review    Plan of Care Reviewed With patient;son  -KR     Outcome Evaluation Pt seen for OT eval this afternoon. Admitted with AMS found to have elevated alcohol level. She states she lives alone in a one story house with [address] garage with a hand rail. She reports using no AD for mobility and is independent with ADLs. Today she performed bed mobility with supervision and stood and mobilized within her room and hallway with SBA/CGA and no use of AD. Able to don socks seated at EOB with no issues. Reports she has been mobilizing to the bathroom with nsg and performing toileting needs herself with no issues. Pt reports she feels at her functional baseline. No acute OT needs at this time. OT to sign off. Please reconsult if status changes  -KR       Row Name 08/27/24 4741          Therapy Assessment/Plan (OT)    Therapy Frequency (OT) evaluation only  -KR       Row Name 08/27/24 6816          Therapy Plan Review/Discharge Plan (OT)    Anticipated Discharge Disposition (OT) home;home with assist  -KR       Row Name 08/27/24 9362          Vital Signs    Pre Patient Position Supine  -KR     Intra Patient Position Standing  -KR     Post Patient Position Sitting  -KR       Row Name 08/27/24 2419          Positioning and Restraints    Pre-Treatment Position in bed  -KR     Post Treatment Position chair  -KR     In Chair reclined;call light within reach;encouraged to call for assist;exit alarm on;with family/caregiver  -KR               User Key  (r) = Recorded By, (t) = Taken By, (c) = Cosigned By      Initials Name Provider Type    Morenita Rosenbaum, ABBIE Occupational Therapist                   Outcome Measures       Row Name 08/27/24 9566          How much help from another is currently needed...    Putting on and  taking off regular lower body clothing? 3  -KR     Bathing (including washing, rinsing, and drying) 3  -KR     Toileting (which includes using toilet bed pan or urinal) 4  -KR     Putting on and taking off regular upper body clothing 4  -KR     Taking care of personal grooming (such as brushing teeth) 4  -KR     Eating meals 4  -KR     AM-PAC 6 Clicks Score (OT) 22  -KR       Row Name 08/27/24 1336          Modified [place] Scale    Modified [place] Scale 2 - Slight disability.  Unable to carry out all previous activities but able to look after own affairs without assistance.  -KR       Row Name 08/27/24 1336          Functional Assessment    Outcome Measure Options Modified [place];AM-PAC 6 Clicks Daily Activity (OT)  -KR               User Key  (r) = Recorded By, (t) = Taken By, (c) = Cosigned By      Initials Name Provider Type    Morenita Rosenbaum OT Occupational Therapist                      OT Recommendation and Plan  Therapy Frequency (OT): evaluation only  Plan of Care Review  Plan of Care Reviewed With: patient, son  Outcome Evaluation: Pt seen for OT eval this afternoon. Admitted with AMS found to have elevated alcohol level. She states she lives alone in a one story house with [address] garage with a hand rail. She reports using no AD for mobility and is independent with ADLs. Today she performed bed mobility with supervision and stood and mobilized within her room and hallway with SBA/CGA and no use of AD. Able to don socks seated at EOB with no issues. Reports she has been mobilizing to the bathroom with nsg and performing toileting needs herself with no issues. Pt reports she feels at her functional baseline. No acute OT needs at this time. OT to sign off. Please reconsult if status changes     Time Calculation:         Time Calculation- OT       Row Name 08/27/24 1336             Time Calculation- OT    OT Start Time 1305  -KR      OT Stop Time 1323  -KR      OT Time Calculation (min) 18 min  -KR      OT  Received On 08/27/24  -YAJAIRA      OT - Next Appointment 08/28/24  -YAJAIRA      OT Goal Re-Cert Due Date 09/10/24  -KR         Timed Charges    72769 - OT Therapeutic Activity Minutes 10  -KR         Total Minutes    Timed Charges Total Minutes 10  -KR       Total Minutes 10  -KR                User Key  (r) = Recorded By, (t) = Taken By, (c) = Cosigned By      Initials Name Provider Type    Morenita Rosenbaum OT Occupational Therapist                  Therapy Charges for Today       Code Description Service Date Service Provider Modifiers Qty    46316751758  OT THERAPEUTIC ACT EA 15 MIN 8/27/2024 Morenita Rice OT GO 1    [id number]  OT EVAL LOW COMPLEXITY 3 8/27/2024 Morenita Rice OT GO 1                 Morenita Rice OT  8/27/2024

## 2024-08-27 NOTE — PLAN OF CARE
Goal Outcome Evaluation:  Plan of Care Reviewed With: patient, son           Outcome Evaluation: Pt seen for OT eval this afternoon. Admitted with AMS found to have elevated alcohol level. She states she lives alone in a one story house with [address] garage with a hand rail. She reports using no AD for mobility and is independent with ADLs. Today she performed bed mobility with supervision and stood and mobilized within her room and hallway with SBA/CGA and no use of AD. Able to don socks seated at EOB with no issues. Reports she has been mobilizing to the bathroom with nsg and performing toileting needs herself with no issues. Pt reports she feels at her functional baseline. No acute OT needs at this time. OT to sign off. Please reconsult if status changes      Anticipated Discharge Disposition (OT): home, home with assist

## 2024-08-27 NOTE — DISCHARGE PLACEMENT REQUEST
"Juan Fritz (69 y.o. Female)       Date of Birth   1955    Social Security Number       Address   [address] [address]    Home Phone   [phone]    MRN   [id number]       Christian   Caodaism    Marital Status                               Admission Date   8/25/24    Admission Type   Emergency    Admitting Provider   Calin Starkey MD    Attending Provider   Calin Starkey MD    Department, Room/Bed   [address], 84/[address]       Discharge Date       Discharge Disposition       Discharge Destination                                 Attending Provider: Calin Starkey MD    Allergies: No Known Allergies    Isolation: None   Infection: None   Code Status: CPR    Ht: 167.6 cm (66\")   Wt: 54.4 kg (120 lb)    Admission Cmt: None   Principal Problem: Alcohol intoxication [F10.929]                   Active Insurance as of 8/25/2024       Primary Coverage       Payor Plan Insurance Group Employer/Plan Group    MEDICARE MEDICARE A & B        Payor Plan Address Payor Plan Phone Number Payor Plan Fax Number Effective Dates    [address] [phone]  6/1/2018 - None Entered    [facility] [id number]         Subscriber Name Subscriber Birth Date Member ID       JUAN FRITZ 1955 [id number]               Secondary Coverage       Payor Plan Insurance Group Employer/Plan Group    [facility] SUPP [id number]       Payor Plan Address Payor Plan Phone Number Payor Plan Fax Number Effective Dates    [address]   7/1/2018 - None Entered    [facility] [id number]         Subscriber Name Subscriber Birth Date Member ID       JUAN FRITZ 1955 [id number]                     Emergency Contacts        (Rel.) Home Phone Work Phone Mobile Phone    Yassine fritz (Son) [phone] -- --                "

## 2024-08-27 NOTE — PROGRESS NOTES
[facility] did follow up with pt.Pt was pleasant and talked about grief counseling she did for loss of son to cancer 6 yrs ago. Pt states she lost her  this past May and went to one grief support group at [facility] but did not go to another as she wasn't feeling well,she states.  Pt states her drinking increased after her   and she drank a whole bottle of [employer] [place] one day. Pt states amount she drinks is up and down but worries she may have a problem as her brothers are alcoholics. Pt states she drinks when she gets lonely and to sleep at night. Access talked with pt about the negative effects alcohol has on physical and mental status. Pt is unsure what she can do as she will not be driving again anytime soon. Pt will work on the solution with her son. Pt has gone out with a friend and stated she enjoyed herself and did not have any alcohol.Access will take resources for outpt RENA and psych tx.
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
## 2024-08-27 NOTE — PROGRESS NOTES
"Daily progress note    Primary care physician      Chief complaint  Awake and alert and feeling better than yesterday with no new complaints except constipation    History of present illness  69-year-old white female with history of hypertension hyperlipidemia anxiety disorder depression and chronic anemia who is well-known to our service brought to the emergency room with altered mental status.  Patient has been abusing alcohol since her  .  Patient denies any headache dizziness chest pain shortness of breath abdominal pain nausea vomiting diarrhea and no speech or focal deficit.  Patient workup in ER revealed alcohol intoxication admitted for management.      REVIEW OF SYSTEMS  All systems reviewed and negative except for those discussed in HPI.      PHYSICAL EXAM  Blood pressure 147/85, pulse 75, temperature 96.7 °F (35.9 °C), temperature source Oral, resp. rate 16, height 167.6 cm (66\"), weight 54.4 kg (120 lb), SpO2 98%.    GENERAL: Awake and alert no acute distress  HENT: nares patent  EYES: no scleral icterus  CV: regular rhythm, normal rate  RESPIRATORY: normal effort, moving air bilaterally  ABDOMEN: soft, nontender nondistended bowel sounds positive  MUSCULOSKELETAL: no deformity  NEURO: Awake and alert with no focal deficit  PSYCH:  calm, cooperative  SKIN: warm, dry     LAB RESULTS  Lab Results (last 24 hours)       Procedure Component Value Units Date/Time    Hemoglobin A1c [[id number]]  (Abnormal) Collected: 24    Specimen: Blood Updated: 24     Hemoglobin A1C <4.20 %     Narrative:      Hemoglobin A1C Ranges:    Increased Risk for Diabetes  5.7% to 6.4%  Diabetes                     >= 6.5%  Diabetic Goal                < 7.0%    BNP [[id number]]  (Normal) Collected: 24    Specimen: Blood Updated: 24 07     proBNP 432.0 pg/mL     Narrative:      This assay is used as an aid in the diagnosis of individuals suspected of having heart failure. It " can be used as an aid in the diagnosis of acute decompensated heart failure (ADHF) in patients presenting with signs and symptoms of ADHF to the emergency department (ED). In addition, NT-proBNP of <300 pg/mL indicates ADHF is not likely.    Age Range Result Interpretation  NT-proBNP Concentration (pg/mL:      <50             Positive            >450                   Gray                 300-450                    Negative             <300    50-75           Positive            >900                  Gray                300-900                  Negative            <300      >75             Positive            >1800                  Gray                300-1800                  Negative            <300    TSH [[id number]]  (Normal) Collected: 08/27/24 0526    Specimen: Blood Updated: 08/27/24 0700     TSH 0.972 uIU/mL     Comprehensive Metabolic Panel [[id number]]  (Abnormal) Collected: 08/27/24 0526    Specimen: Blood Updated: 08/27/24 0655     Glucose 111 mg/dL      BUN 8 mg/dL      Creatinine 0.49 mg/dL      Sodium 137 mmol/L      Potassium 4.1 mmol/L      Chloride 104 mmol/L      CO2 27.0 mmol/L      Calcium 8.7 mg/dL      Total Protein 5.6 g/dL      Albumin 3.9 g/dL      ALT (SGPT) 18 U/L      AST (SGOT) 22 U/L      Alkaline Phosphatase 52 U/L      Total Bilirubin 0.5 mg/dL      Globulin 1.7 gm/dL      A/G Ratio 2.3 g/dL      BUN/Creatinine Ratio 16.3     Anion Gap 6.0 mmol/L      eGFR 100.9 mL/min/1.73     Narrative:      GFR Normal >60  Chronic Kidney Disease <60  Kidney Failure <15    The GFR formula is only valid for adults with stable renal function between ages 18 and 70.    Lipid Panel [[id number]]  (Abnormal) Collected: 08/27/24 0526    Specimen: Blood Updated: 08/27/24 0655     Total Cholesterol 188 mg/dL      Triglycerides 68 mg/dL      HDL Cholesterol 107 mg/dL      LDL Cholesterol  68 mg/dL      VLDL Cholesterol 13 mg/dL      LDL/HDL Ratio 0.63    Narrative:      Cholesterol Reference Ranges  (U.S.  Department of Health and Human Services ATP III Classifications)    Desirable          <200 mg/dL  Borderline High    200-239 mg/dL  High Risk          >240 mg/dL      Triglyceride Reference Ranges  (U.S. Department of Health and Human Services ATP III Classifications)    Normal           <150 mg/dL  Borderline High  150-199 mg/dL  High             200-499 mg/dL  Very High        >500 mg/dL    HDL Reference Ranges  (U.S. Department of Health and Human Services ATP III Classifications)    Low     <40 mg/dl (major risk factor for CHD)  High    >60 mg/dl ('negative' risk factor for CHD)        LDL Reference Ranges  (U.S. Department of Health and Human Services ATP III Classifications)    Optimal          <100 mg/dL  Near Optimal     100-129 mg/dL  Borderline High  130-159 mg/dL  High             160-189 mg/dL  Very High        >189 mg/dL    CBC & Differential [[id number]]  (Abnormal) Collected: 08/27/24 0526    Specimen: Blood Updated: 08/27/24 0630    Narrative:      The following orders were created for panel order CBC & Differential.  Procedure                               Abnormality         Status                     ---------                               -----------         ------                     CBC Auto Differential[[id number]]        Abnormal            Final result                 Please view results for these tests on the individual orders.    CBC Auto Differential [[id number]]  (Abnormal) Collected: 08/27/24 0526    Specimen: Blood Updated: 08/27/24 0630     WBC 4.40 10*3/mm3      RBC 2.94 10*6/mm3      Hemoglobin 9.8 g/dL      Hematocrit 30.1 %      .4 fL      MCH 33.3 pg      MCHC 32.6 g/dL      RDW 13.4 %      RDW-SD 50.7 fl      MPV 10.9 fL      Platelets 182 10*3/mm3      Neutrophil % 72.7 %      Lymphocyte % 13.4 %      Monocyte % 12.7 %      Eosinophil % 0.5 %      Basophil % 0.5 %      Immature Grans % 0.2 %      Neutrophils, Absolute 3.20 10*3/mm3      Lymphocytes, Absolute 0.59 10*3/mm3       Monocytes, Absolute 0.56 10*3/mm3      Eosinophils, Absolute 0.02 10*3/mm3      Basophils, Absolute 0.02 10*3/mm3      Immature Grans, Absolute 0.01 10*3/mm3      nRBC 0.0 /100 WBC           Imaging Results (Last 24 Hours)       ** No results found for the last 24 hours. **          Scan on 8/25/2024 1840 by [facility], [place]: ECG 12-LEAD         Author: -- Service: -- Author Type: --   Filed: Date of Service: Creation Time:   Status: (Other)   HEART RATE=86  bpm  RR Slutjesa=079  ms  AZ Gifcdxcv=260  ms  P Horizontal Axis=24  deg  P Front Axis=67  deg  QRSD Nezwiuhd=836  ms  QT Hitbdvwy=124  ms  KVgC=487  ms  QRS Axis=-42  deg  T Wave Axis=59  deg  - ABNORMAL ECG -  Sinus rhythm  Incomplete RBBB and LAFB  RSR' in V1 or V2, right VCD or RVH  ST elevation, consider inferior injury          Current Facility-Administered Medications:     acetaminophen (TYLENOL) tablet 650 mg, 650 mg, Oral, Q4H PRN, Calin Starkey MD, 650 mg at 08/27/24 1043    amLODIPine (NORVASC) tablet 10 mg, 10 mg, Oral, Daily, Calin Starkey MD, 10 mg at 08/27/24 0834    aspirin chewable tablet 81 mg, 81 mg, Oral, Daily, Calin Starkey MD, 81 mg at 08/27/24 0835    cetirizine (zyrTEC) tablet 10 mg, 10 mg, Oral, Daily, Calin Starkey MD    escitalopram (LEXAPRO) tablet 10 mg, 10 mg, Oral, Nightly, Calin Starkey MD, 10 mg at 08/26/24 2041    famotidine (PEPCID) tablet 20 mg, 20 mg, Oral, BID, Calin Starkey MD, 20 mg at 08/27/24 0835    folic acid (FOLVITE) tablet 1 mg, 1 mg, Oral, Daily, Calin Starkey MD, 1 mg at 08/27/24 0835    LORazepam (ATIVAN) tablet 0.5 mg, 0.5 mg, Oral, Q4H PRN, Calin Starkey MD, 0.5 mg at 08/26/24 213    metoprolol tartrate (LOPRESSOR) tablet 12.5 mg, 12.5 mg, Oral, Q12H, Calin Starkey MD, 12.5 mg at 08/27/24 0835    multivitamin (THERAGRAN) tablet 1 tablet, 1 tablet, Oral, Daily, Calin Starkey MD, 1 tablet at 08/27/24 0835    polyethylene glycol (MIRALAX) packet 17 g, 17 g, Oral, Daily, Calin Starkey MD    [COMPLETED]  Insert Peripheral IV, , , Once **AND** sodium chloride 0.9 % flush 10 mL, 10 mL, Intravenous, PRN, Newton Johnson II, MD    sodium chloride 0.9 % with KCl 20 mEq/L infusion, 75 mL/hr, Intravenous, Continuous, Sofya Starkey MD, Last Rate: 75 mL/hr at 08/27/24 1151, 75 mL/hr at 08/27/24 1151    thiamine (VITAMIN B-1) tablet 200 mg, 200 mg, Oral, Daily, Sofya Starkey MD, 200 mg at 08/27/24 0835     ASSESSMENT  Alcohol intoxication  Metabolic encephalopathy  Hypertension  Hyperlipidemia  Anxiety disorder  Depression  Chronic anemia  Chronic constipation  Gastroesophageal reflux disease    PLAN  CPM  Continue IVF  Continue detox medications  CIWA protocol if needed  Bowel program  Continue home medications  Stress ulcer DVT prophylaxis  Access center to follow patient  Supportive care  PT OT  Discussed with nursing staff  Follow closely further recommendation current hospital course    SOFYA STARKEY MD    Copied text in this note has been reviewed and is accurate as of 08/27/24

## 2024-08-27 NOTE — PLAN OF CARE
Problem: Malnutrition  Goal: Improved Nutritional Intake  Outcome: Ongoing, Progressing   Goal Outcome Evaluation:   RD to follow

## 2024-08-27 NOTE — PLAN OF CARE
Goal Outcome Evaluation:  Plan of Care Reviewed With: patient        Progress: no change  Outcome Evaluation: Pt is A&Ox4, but very forgetful. Triggered multiple bed alarms tonight after being educated on using the call light. Voiding well. Fall precautions maintained. Up to toilet with x1 assist. Son awaiting resource information from . Ativan given x1 for anxiety at night. CIWA complated q4; last score was 2. IVFs infusing without difficulty. POC ongoing.

## 2024-08-27 NOTE — PROGRESS NOTES
Nutrition Services    Patient Name:  Grazyna Fritz  YOB: 1955  MRN: [id number]  Admit Date:  8/25/2024    Assessment Date:  08/27/24    Summary: BMI 19.3    Nutrition assessment initiated per BMI. 69yoF admitted for alcohol intoxication without AMS. Hx of HTN, GERD, anxiety and depression. Pt reports fair appetite during visit. States she is not able to eat everything r/t big portions. Had 75% PO intake last night per EMR. C/o abdominal pain and constipation. States having a small BM only. Reports taking miralax at home sometimes. RN notified. Pt may benefit in laxative to help with BM. Reports drinking ensure at home once daily and eating mostly RTE foods. States she does her own grocery shopping but her one other son does not want her to drive anymore. Pt with wt loss over the years per wt hx. Noted moderate to severe muscle wasting and fat loss per NFPE. States she only drinks because she is lonely and preparing food sometimes is challenging. Discussed strategies to increase food intake at home and eat healthy. Labs and meds reviewed.    Patient meets ASPEN/AND criteria for nutrition diagnosis of severe malnutrition of chronic illness based on: inadequate energy intake > 3 months, NFPE results    Plan/recs:  Boost plus BID  May benefit in laxative/BM regimen  Encourage healthy nutrition/PO intake    RD to follow    CLINICAL NUTRITION ASSESSMENT      Reason for Assessment BMI     Diagnosis/Problem   ETOH intoxication without AMS   Medical/Surgical History Past Medical History:   Diagnosis Date    Anxiety and depression     Arthritis     OSTEO. HANDS    Basal cell carcinoma of skin 2001    REMOVED FROM FACE    Cataract     Elevated cholesterol     GERD (gastroesophageal reflux disease)     Hypertension     Right knee pain     TORN MENISCUS    Seasonal allergies     Urge incontinence        Past Surgical History:   Procedure Laterality Date    BASAL CELL CARCINOMA EXCISION  2001    FACE     "BUNIONECTOMY Right 2014    COLONOSCOPY N/A 2006    [facility]    COLONOSCOPY N/A 11/21/2019    Procedure: COLONOSCOPY;  Surgeon: Berkley Herring MD;  Location: [facility] ENDOSCOPY;  Service: General    CYST REMOVAL N/A 03/27/2018    In-office procedure: Excision of 2.0 sebaceous cyst of the left midback, Dr. Berkley Herring    FOOT FUSION Left 6/26/2017    Procedure: LT TIBIAL CALCANEAL & MID FOOT OSTEOTOMIES W/ BONE GRAFT LT PERONEUS BREVIS LONGUS TENDON TRANSFER TENDO ACHILLES LENGTHENING ;  Surgeon: Ok Ferrari MD;  Location:  [facility];  Service:     KNEE ARTHROSCOPY Right 6/16/2020    Procedure: RIGHT KNEE ARTHROSCOPY PARTIAL MEDIAL MENISECTOMY SUBCHONDROPLASTY MEDIAL TIBIAL PLATEAU;  Surgeon: Nicolas Álvarez MD;  [place] [facility] [place] [facility] [place]  Service: [place]opedics;  Laterality: Right;    LAPAROSCOPIC CHOLECYSTECTOMY W/ CHOLANGIOGRAPHY N/A 05/11/2006    Dr. Toby Woo    TOTAL HIP ARTHROPLASTY Left 5/19/2022    Procedure: TOTAL HIP ARTHROPLASTY ANTERIOR WITH HANA TABLE;  Surgeon: Dav Adair II, MD;  Location: [facility] MAIN OR;  Service: Orthopedics;  Laterality: Left;    TUBAL ABDOMINAL LIGATION Bilateral         Anthropometrics        Current Height  Current Weight  BMI kg/m2 Height: 167.6 cm (66\")  Weight: 54.4 kg (120 lb) (08/25/24 1829)  Body mass index is 19.37 kg/m².   Adjusted BMI (if applicable)    BMI Category Normal/Healthy (18.4 - 24.9)   Ideal Body Weight (IBW) 130#   Usual Body Weight (UBW) See below   Weight Trend Loss   Weight History Wt Readings from Last 30 Encounters:   08/25/24 1829 54.4 kg (120 lb)   05/17/22 1035 51.8 kg (114 lb 4.8 oz)   06/15/20 1138 64.3 kg (141 lb 12.8 oz)   11/21/19 0828 64.9 kg (143 lb)   03/27/18 0809 68.7 kg (151 lb 6.4 oz)   06/26/17 1220 70.8 kg (156 lb)   06/26/17 0610 70.8 kg (156 lb)   06/12/17 0818 69.9 kg (154 lb)        Estimated Requirements         Weight used  54.4 kg    Calories  1360 -  1632 kcal (25-30 kcal/kg)    " Protein  65 - 82 g (1.2 - 1.5 gm/kg)    Fluid   (1 mL/kcal)     Labs       Pertinent Labs    Results from last 7 days   Lab Units 08/27/24  0526 08/26/24  0453 08/25/24  1841   SODIUM mmol/L 137 136 134*   POTASSIUM mmol/L 4.1 3.8 3.5   CHLORIDE mmol/L 104 100 95*   CO2 mmol/L 27.0 27.3 26.0   BUN mg/dL 8 8 10   CREATININE mg/dL 0.49* 0.59 0.64   CALCIUM mg/dL 8.7 9.0 9.0   BILIRUBIN mg/dL 0.5 0.5 0.2   ALK PHOS U/L 52 51 60   ALT (SGPT) U/L 18 24 24   AST (SGOT) U/L 22 34* 38*   GLUCOSE mg/dL 111* 105* 109*     Results from last 7 days   Lab Units 08/27/24 0526 08/26/24 0453 08/25/24  1841   MAGNESIUM mg/dL  --   --  2.0   PHOSPHORUS mg/dL  --   --  3.7   HEMOGLOBIN g/dL 9.8*   < > 10.2*   HEMATOCRIT % 30.1*   < > 31.9*   WBC 10*3/mm3 4.40   < > 4.66   TRIGLYCERIDES mg/dL 68  --   --    ALBUMIN g/dL 3.9   < > 4.4    < > = values in this interval not displayed.     Results from last 7 days   Lab Units 08/27/24 0526 08/26/24 0453 08/25/24  1841   PLATELETS 10*3/mm3 182 201 239     COVID19   Date Value Ref Range Status   05/17/2022 Not Detected Not Detected - Ref. Range Final     Lab Results   Component Value Date    HGBA1C <4.20 (L) 08/27/2024          Medications           Scheduled Medications amLODIPine, 10 mg, Oral, Daily  aspirin, 81 mg, Oral, Daily  escitalopram, 10 mg, Oral, Nightly  famotidine, 20 mg, Oral, BID  folic acid, 1 mg, Oral, Daily  metoprolol tartrate, 12.5 mg, Oral, Q12H  multivitamin, 1 tablet, Oral, Daily  thiamine, 200 mg, Oral, Daily       Infusions sodium chloride 0.9 % with KCl 20 mEq, 75 mL/hr, Last Rate: 75 mL/hr (08/27/24 1151)       PRN Medications   acetaminophen    LORazepam    [COMPLETED] Insert Peripheral IV **AND** sodium chloride     Physical Findings          General Findings alert, loss of muscle mass, loss of subcutaneous fat, oriented, room air   Oral/Mouth Cavity WDL   Edema  no edema   Gastrointestinal hypoactive bowel sounds, last bowel movement: 8/26   Skin  skin intact    Tubes/Drains/Lines none   NFPE See Malnutrition Severity Assessment, Date Completed: 8/27 GR     Malnutrition Severity Assessment      Patient meets criteria for : Severe Malnutrition  Malnutrition Type (Last 8 Hours)       Malnutrition Severity Assessment       Row Name 08/27/24 1208       Malnutrition Severity Assessment    Malnutrition Type Chronic Disease - Related Malnutrition      Row Name 08/27/24 1208       Insufficient Energy Intake     Insufficient Energy Intake Findings Severe    Insufficient Energy Intake  <75% of est. energy requirement for > or equal to 3 months      Row Name 08/27/24 1208       Muscle Loss    Loss of Muscle Mass Findings Severe    [place] Region Moderate - slight depression    Clavicle Bone Region Moderate - some protrusion in females, visible in males    Acromion Bone Region Moderate - acromion may slightly protrude    Scapular Bone Region Severe - prominent bones, depressions easily visible between ribs, scapula, spine, shoulders    Dorsal Hand Region Moderate - slight depression    Patellar Region Moderate - patella more prominent, less muscle definition around patella    Anterior Thigh Region Moderate - mild depression on inner thigh    Posterior Calf Region Moderate - some roundness, slight firmness      Row Name 08/27/24 1208       Fat Loss    Subcutaneous Fat Loss Findings Severe    Orbital Region  Moderate -  somewhat hollowness, slightly dark circles    Upper Arm Region Severe - mostly skin, very little space between folds, fingers touch    Thoracic & Lumbar Region Moderate - ribs visible with mild depressions, iliac crest somewhat prominent      Row Name 08/27/24 1208       Criteria Met (Must meet criteria for severity in at least 2 of these categories: M Wasting, Fat Loss, Fluid, Secondary Signs, Wt. Status, Intake)    Patient meets criteria for  Severe Malnutrition                       Current Nutrition Orders & Evaluation of Intake       Oral Nutrition     Food  Allergies NKFA   Current PO Diet Diet: Regular/House; Fluid Consistency: Thin (IDDSI 0)   Supplement n/a   PO Evaluation     % PO Intake 75%    Factors Affecting Intake: constipation, emotional status   --  PES STATEMENT / NUTRITION DIAGNOSIS      Nutrition Dx Problem  Problem: Malnutrition (severe)  Etiology: Medical Diagnosis - ETOH intoxication    Signs/Symptoms: PO intake, Report of Minimal PO Intake, NFPE Results, and Report/Observation     NUTRITION INTERVENTION / PLAN OF CARE      Intervention Goal(s) Establish goals of care, Reduce/improve symptoms, Meet estimated needs, Disease management/therapy, Establish PO intake, Increase intake, Accepts oral nutrition supplement, Maintain weight, No significant weight loss, and PO intake goal %: >75         RD Intervention/Action Encourage intake, Continue to monitor, Care plan reviewed, Recommend/order: laxative, boost plus, and Education regarding: strategies to increase PO intake at home   --      Prescription/Orders:       PO Diet       Supplements Boost plus BID      Enteral Nutrition       Parenteral Nutrition    New Prescription Ordered? Yes   --      Monitor/Evaluation Per protocol   Discharge Plan/Needs Pending clinical course   --    RD to follow per protocol.      Electronically signed by:  Linda Newell RD  08/27/24 11:54 EDT

## 2024-08-27 NOTE — PROGRESS NOTES
Continued Stay Note  [facility]     Patient Name: Grazyna Fritz  MRN: [id number]  Today's Date: 8/27/2024    Admit Date: 8/25/2024    Plan: Home with [facility]   Discharge Plan       Row Name 08/27/24 1455       Plan    Plan Home with [facility]    Plan Comments Met with patient and son, Yassine at bedside to discuss DC plan. Plan is to return home. Yassine stated patient lives alone but family/friends will assist as needed. Yassine stated all licquor removed from house and car keys taken. Discussed [facility] accepted [facility] services. Discussed and provided [facility] number as they have interest in new PCP. Discussed medications and poss having [facility] deliver and pre-packet them. Provided number [phone] for [facility]. Son asked about testing for Dementia. Provided [facility] Number [phone]. Also discussed In-home Personal Care Agencies and [facility] pamplet provided. Son verbalizes understanding of all. Son stated he is having mgt with Elder Law. Denies any additional needs/equipment                   Discharge Codes    No documentation.                 Expected Discharge Date and Time       Expected Discharge Date Expected Discharge Time    Aug 28, 2024               Haily Garcia, RN
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
## 2024-08-27 NOTE — PLAN OF CARE
Goal Outcome Evaluation:              Outcome Evaluation: VSS. IV fluids infusing. Fall precautions maintained. Worked with PT and OT. Headache today, Tylenol given. CIWA Assessment.

## 2024-08-28 VITALS
HEIGHT: 66 IN | TEMPERATURE: 97.6 F | HEART RATE: 87 BPM | BODY MASS INDEX: 19.29 KG/M2 | RESPIRATION RATE: 16 BRPM | OXYGEN SATURATION: 99 % | SYSTOLIC BLOOD PRESSURE: 146 MMHG | DIASTOLIC BLOOD PRESSURE: 83 MMHG | WEIGHT: 120 LBS

## 2024-08-28 LAB
ALBUMIN SERPL-MCNC: 3.6 G/DL (ref 3.5–5.2)
ALBUMIN/GLOB SERPL: 2.1 G/DL
ALP SERPL-CCNC: 47 U/L (ref 39–117)
ALT SERPL W P-5'-P-CCNC: 18 U/L (ref 1–33)
ANION GAP SERPL CALCULATED.3IONS-SCNC: 6 MMOL/L (ref 5–15)
AST SERPL-CCNC: 20 U/L (ref 1–32)
BASOPHILS # BLD AUTO: 0.02 10*3/MM3 (ref 0–0.2)
BASOPHILS NFR BLD AUTO: 0.5 % (ref 0–1.5)
BILIRUB SERPL-MCNC: 0.3 MG/DL (ref 0–1.2)
BUN SERPL-MCNC: 9 MG/DL (ref 8–23)
BUN/CREAT SERPL: 16.4 (ref 7–25)
CALCIUM SPEC-SCNC: 8.7 MG/DL (ref 8.6–10.5)
CHLORIDE SERPL-SCNC: 109 MMOL/L (ref 98–107)
CO2 SERPL-SCNC: 27 MMOL/L (ref 22–29)
CREAT SERPL-MCNC: 0.55 MG/DL (ref 0.57–1)
DEPRECATED RDW RBC AUTO: 50.3 FL (ref 37–54)
EGFRCR SERPLBLD CKD-EPI 2021: 98.1 ML/MIN/1.73
EOSINOPHIL # BLD AUTO: 0.06 10*3/MM3 (ref 0–0.4)
EOSINOPHIL NFR BLD AUTO: 1.4 % (ref 0.3–6.2)
ERYTHROCYTE [DISTWIDTH] IN BLOOD BY AUTOMATED COUNT: 13.3 % (ref 12.3–15.4)
GLOBULIN UR ELPH-MCNC: 1.7 GM/DL
GLUCOSE SERPL-MCNC: 101 MG/DL (ref 65–99)
HCT VFR BLD AUTO: 28.9 % (ref 34–46.6)
HGB BLD-MCNC: 9.4 G/DL (ref 12–15.9)
IMM GRANULOCYTES # BLD AUTO: 0.02 10*3/MM3 (ref 0–0.05)
IMM GRANULOCYTES NFR BLD AUTO: 0.5 % (ref 0–0.5)
LYMPHOCYTES # BLD AUTO: 0.88 10*3/MM3 (ref 0.7–3.1)
LYMPHOCYTES NFR BLD AUTO: 21.2 % (ref 19.6–45.3)
MCH RBC QN AUTO: 33.5 PG (ref 26.6–33)
MCHC RBC AUTO-ENTMCNC: 32.5 G/DL (ref 31.5–35.7)
MCV RBC AUTO: 102.8 FL (ref 79–97)
MONOCYTES # BLD AUTO: 0.48 10*3/MM3 (ref 0.1–0.9)
MONOCYTES NFR BLD AUTO: 11.5 % (ref 5–12)
NEUTROPHILS NFR BLD AUTO: 2.7 10*3/MM3 (ref 1.7–7)
NEUTROPHILS NFR BLD AUTO: 64.9 % (ref 42.7–76)
NRBC BLD AUTO-RTO: 0 /100 WBC (ref 0–0.2)
PLATELET # BLD AUTO: 162 10*3/MM3 (ref 140–450)
PMV BLD AUTO: 10.4 FL (ref 6–12)
POTASSIUM SERPL-SCNC: 4.6 MMOL/L (ref 3.5–5.2)
PROT SERPL-MCNC: 5.3 G/DL (ref 6–8.5)
RBC # BLD AUTO: 2.81 10*6/MM3 (ref 3.77–5.28)
SODIUM SERPL-SCNC: 142 MMOL/L (ref 136–145)
WBC NRBC COR # BLD AUTO: 4.16 10*3/MM3 (ref 3.4–10.8)

## 2024-08-28 PROCEDURE — 80053 COMPREHEN METABOLIC PANEL: CPT | Performed by: HOSPITALIST

## 2024-08-28 PROCEDURE — 25010000002 SODIUM CHLORIDE 0.9 % WITH KCL 20 MEQ 20-0.9 MEQ/L-% SOLUTION: Performed by: HOSPITALIST

## 2024-08-28 PROCEDURE — 85025 COMPLETE CBC W/AUTO DIFF WBC: CPT | Performed by: HOSPITALIST

## 2024-08-28 RX ORDER — DIPHENOXYLATE HYDROCHLORIDE AND ATROPINE SULFATE 2.5; .025 MG/1; MG/1
1 TABLET ORAL DAILY
Qty: 30 TABLET | Refills: 0 | Status: SHIPPED | OUTPATIENT
Start: 2024-08-29 | End: 2024-09-28

## 2024-08-28 RX ORDER — CETIRIZINE HYDROCHLORIDE 10 MG/1
10 TABLET ORAL DAILY
Qty: 30 TABLET | Refills: 0 | Status: SHIPPED | OUTPATIENT
Start: 2024-08-29 | End: 2024-09-28

## 2024-08-28 RX ORDER — PSEUDOEPHEDRINE HCL 30 MG
100 TABLET ORAL 2 TIMES DAILY
Qty: 60 CAPSULE | Refills: 0 | Status: SHIPPED | OUTPATIENT
Start: 2024-08-28 | End: 2024-09-27

## 2024-08-28 RX ORDER — AMLODIPINE BESYLATE 10 MG/1
10 TABLET ORAL DAILY
Qty: 30 TABLET | Refills: 0 | Status: SHIPPED | OUTPATIENT
Start: 2024-08-29 | End: 2024-09-28

## 2024-08-28 RX ORDER — POLYETHYLENE GLYCOL 3350 17 G/17G
17 POWDER, FOR SOLUTION ORAL DAILY
Qty: 510 G | Refills: 0 | Status: SHIPPED | OUTPATIENT
Start: 2024-08-29 | End: 2024-09-28

## 2024-08-28 RX ORDER — LANOLIN ALCOHOL/MO/W.PET/CERES
200 CREAM (GRAM) TOPICAL DAILY
Qty: 60 TABLET | Refills: 0 | Status: SHIPPED | OUTPATIENT
Start: 2024-08-29 | End: 2024-09-28

## 2024-08-28 RX ORDER — ASPIRIN 81 MG/1
81 TABLET, CHEWABLE ORAL DAILY
Qty: 30 TABLET | Refills: 0 | Status: SHIPPED | OUTPATIENT
Start: 2024-08-29 | End: 2024-09-28

## 2024-08-28 RX ORDER — METOPROLOL TARTRATE 25 MG/1
12.5 TABLET, FILM COATED ORAL EVERY 12 HOURS SCHEDULED
Qty: 30 TABLET | Refills: 0 | Status: SHIPPED | OUTPATIENT
Start: 2024-08-28 | End: 2024-09-27

## 2024-08-28 RX ORDER — FOLIC ACID 1 MG/1
1 TABLET ORAL DAILY
Qty: 30 TABLET | Refills: 0 | Status: SHIPPED | OUTPATIENT
Start: 2024-08-29 | End: 2024-09-28

## 2024-08-28 RX ADMIN — DOCUSATE SODIUM 100 MG: 100 CAPSULE, LIQUID FILLED ORAL at 09:24

## 2024-08-28 RX ADMIN — ACETAMINOPHEN 325MG 650 MG: 325 TABLET ORAL at 10:28

## 2024-08-28 RX ADMIN — Medication 200 MG: at 09:24

## 2024-08-28 RX ADMIN — Medication 1 TABLET: at 09:23

## 2024-08-28 RX ADMIN — FOLIC ACID 1 MG: 1 TABLET ORAL at 09:23

## 2024-08-28 RX ADMIN — POLYETHYLENE GLYCOL 3350 17 G: 17 POWDER, FOR SOLUTION ORAL at 09:24

## 2024-08-28 RX ADMIN — ASPIRIN 81 MG: 81 TABLET, CHEWABLE ORAL at 09:24

## 2024-08-28 RX ADMIN — POTASSIUM CHLORIDE AND SODIUM CHLORIDE 75 ML/HR: 900; 150 INJECTION, SOLUTION INTRAVENOUS at 01:46

## 2024-08-28 RX ADMIN — AMLODIPINE BESYLATE 10 MG: 10 TABLET ORAL at 09:24

## 2024-08-28 RX ADMIN — FAMOTIDINE 20 MG: 20 TABLET, FILM COATED ORAL at 09:24

## 2024-08-28 RX ADMIN — CETIRIZINE HYDROCHLORIDE 10 MG: 10 TABLET ORAL at 09:23

## 2024-08-28 RX ADMIN — METOPROLOL TARTRATE 12.5 MG: 25 TABLET, FILM COATED ORAL at 09:24

## 2024-08-28 NOTE — PLAN OF CARE
Goal Outcome Evaluation:  Plan of Care Reviewed With: patient        Progress: no change  Outcome Evaluation: vss, tylenol given for headache, very anxious ativan po given, CIWA q4h, up with assist standby, bed alarm for safety, access seen pt last night, continue to monitor the pt.
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
## 2024-08-28 NOTE — NURSING NOTE
Pt discharged to home. IV removed. Prescriptions delivered to bedside by our retail pharmacy. Discharge instructions provided. Son providing transportation.

## 2024-08-28 NOTE — PROGRESS NOTES
Per [facility] clinician request, this writer provided pt with outpatient counseling and psychiatrist resources; due to pt's mentation/forgetfulness and with her permission, this writer left general voicemail message for pt's son (Yassine, [phone]) regarding referral delivery. No further needs and/or concerns noted per RN Maria Guadalupe and/or pt/son; [facility] to continue following.

## 2024-08-28 NOTE — DISCHARGE SUMMARY
Discharge summary    Date of admission 8/25/2024  Date of discharge 8/28/2024    Final diagnosis  Status post alcohol intoxication  Metabolic encephalopathy resolved  Hypertension  Hyperlipidemia  Anxiety disorder  Depression  Chronic anemia  Chronic constipation resolved  Gastroesophageal reflux disease    Discharge medications    Current Facility-Administered Medications:     acetaminophen (TYLENOL) tablet 650 mg, 650 mg, Oral, Q4H PRN, Calin Starkey MD, 650 mg at 08/28/24 1028    amLODIPine (NORVASC) tablet 10 mg, 10 mg, Oral, Daily, Calin Starkey MD, 10 mg at 08/28/24 0924    aspirin chewable tablet 81 mg, 81 mg, Oral, Daily, Calin Starkey MD, 81 mg at 08/28/24 0924    bisacodyl (DULCOLAX) suppository 10 mg, 10 mg, Rectal, Daily PRN, Calin Starkey MD    cetirizine (zyrTEC) tablet 10 mg, 10 mg, Oral, Daily, Calin Starkey MD, 10 mg at 08/28/24 0923    docusate sodium (COLACE) capsule 100 mg, 100 mg, Oral, BID, Calin Starkey MD, 100 mg at 08/28/24 0924    escitalopram (LEXAPRO) tablet 10 mg, 10 mg, Oral, Nightly, Calin Starkey MD, 10 mg at 08/27/24 2017    famotidine (PEPCID) tablet 20 mg, 20 mg, Oral, BID, Calin Starkey MD, 20 mg at 08/28/24 0924    folic acid (FOLVITE) tablet 1 mg, 1 mg, Oral, Daily, Calin Starkey MD, 1 mg at 08/28/24 0923    metoprolol tartrate (LOPRESSOR) tablet 12.5 mg, 12.5 mg, Oral, Q12H, Calin Starkey MD, 12.5 mg at 08/28/24 0924    multivitamin (THERAGRAN) tablet 1 tablet, 1 tablet, Oral, Daily, Calin Starkey MD, 1 tablet at 08/28/24 0923    polyethylene glycol (MIRALAX) packet 17 g, 17 g, Oral, Daily, Calin Starkey MD, 17 g at 08/28/24 0924    [COMPLETED] Insert Peripheral IV, , , Once **AND** sodium chloride 0.9 % flush 10 mL, 10 mL, Intravenous, PRN, Newton Johnson II, MD    thiamine (VITAMIN B-1) tablet 200 mg, 200 mg, Oral, Daily, Calin Starkey MD, 200 mg at 08/28/24 0923     Consults obtained  Psychiatry  Spiritual care    Procedures  None    Hospital course  69-year-old white  female with history of hypertension hyperlipidemia anxiety disorder depression and chronic anemia who abuse alcohol admitted through emergency room with altered mental status.  Patient workup in ER revealed alcohol intoxication admitted for management.  Patient admitted treated with supportive care detox medication and CIWA protocol.  Patient responded to the treatment and has no alcohol withdrawal syndrome.  Patient blood pressure medication adjusted during his hospitalization.  Patient followed by psychiatry and recommend outpatient alcohol withdrawal program.  Patient cleared for discharge.  Patient has chronic constipation which also resolved with bowel program.    Discharge diet regular    Activity as tolerated    Medications above    Follow-up with primary doctor in 1 week and follow-up with psychiatry per the instructions and take medication as directed.    SOFYA SEGURA MD

## 2024-08-28 NOTE — PROGRESS NOTES
"Daily progress note    Primary care physician      Chief complaint  Doing better with no new complaint and wants to go home    History of present illness  69-year-old white female with history of hypertension hyperlipidemia anxiety disorder depression and chronic anemia who is well-known to our service brought to the emergency room with altered mental status.  Patient has been abusing alcohol since her  .  Patient denies any headache dizziness chest pain shortness of breath abdominal pain nausea vomiting diarrhea and no speech or focal deficit.  Patient workup in ER revealed alcohol intoxication admitted for management.      REVIEW OF SYSTEMS  All systems reviewed and negative except for those discussed in HPI.      PHYSICAL EXAM  Blood pressure 146/83, pulse 87, temperature 97.6 °F (36.4 °C), temperature source Oral, resp. rate 16, height 167.6 cm (66\"), weight 54.4 kg (120 lb), SpO2 99%.    GENERAL: Awake and alert no acute distress  HENT: nares patent  EYES: no scleral icterus  CV: regular rhythm, normal rate  RESPIRATORY: normal effort, moving air bilaterally  ABDOMEN: soft, nontender nondistended bowel sounds positive  MUSCULOSKELETAL: no deformity  NEURO: Awake and alert with no focal deficit  PSYCH:  calm, cooperative  SKIN: warm, dry     LAB RESULTS  Lab Results (last 24 hours)       Procedure Component Value Units Date/Time    Comprehensive Metabolic Panel [[id number]]  (Abnormal) Collected: 24    Specimen: Blood Updated: 24     Glucose 101 mg/dL      BUN 9 mg/dL      Creatinine 0.55 mg/dL      Sodium 142 mmol/L      Potassium 4.6 mmol/L      Chloride 109 mmol/L      CO2 27.0 mmol/L      Calcium 8.7 mg/dL      Total Protein 5.3 g/dL      Albumin 3.6 g/dL      ALT (SGPT) 18 U/L      AST (SGOT) 20 U/L      Alkaline Phosphatase 47 U/L      Total Bilirubin 0.3 mg/dL      Globulin 1.7 gm/dL      A/G Ratio 2.1 g/dL      BUN/Creatinine Ratio 16.4     Anion Gap 6.0 mmol/L  "     eGFR 98.1 mL/min/1.73     Narrative:      GFR Normal >60  Chronic Kidney Disease <60  Kidney Failure <15    The GFR formula is only valid for adults with stable renal function between ages 18 and 70.    CBC & Differential [[id number]]  (Abnormal) Collected: 08/28/24 0528    Specimen: Blood Updated: 08/28/24 0555    Narrative:      The following orders were created for panel order CBC & Differential.  Procedure                               Abnormality         Status                     ---------                               -----------         ------                     CBC Auto Differential[[id number]]        Abnormal            Final result                 Please view results for these tests on the individual orders.    CBC Auto Differential [[id number]]  (Abnormal) Collected: 08/28/24 0528    Specimen: Blood Updated: 08/28/24 0555     WBC 4.16 10*3/mm3      RBC 2.81 10*6/mm3      Hemoglobin 9.4 g/dL      Hematocrit 28.9 %      .8 fL      MCH 33.5 pg      MCHC 32.5 g/dL      RDW 13.3 %      RDW-SD 50.3 fl      MPV 10.4 fL      Platelets 162 10*3/mm3      Neutrophil % 64.9 %      Lymphocyte % 21.2 %      Monocyte % 11.5 %      Eosinophil % 1.4 %      Basophil % 0.5 %      Immature Grans % 0.5 %      Neutrophils, Absolute 2.70 10*3/mm3      Lymphocytes, Absolute 0.88 10*3/mm3      Monocytes, Absolute 0.48 10*3/mm3      Eosinophils, Absolute 0.06 10*3/mm3      Basophils, Absolute 0.02 10*3/mm3      Immature Grans, Absolute 0.02 10*3/mm3      nRBC 0.0 /100 WBC           Imaging Results (Last 24 Hours)       ** No results found for the last 24 hours. **          Scan on 8/25/2024 1840 by [employer] [place]: ECG 12-LEAD         Author: -- Service: -- Author Type: --   Filed: Date of Service: Creation Time:   Status: (Other)   HEART RATE=86  bpm  RR Yanjkzkg=839  ms  LA Mjeuelre=229  ms  P Horizontal Axis=24  deg  P Front Axis=67  deg  QRSD Osvubvos=131  ms  QT Pkalcyxm=725  ms  LHyH=106  ms  QRS Axis=-42   deg  T Wave Axis=59  deg  - ABNORMAL ECG -  Sinus rhythm  Incomplete RBBB and LAFB  RSR' in V1 or V2, right VCD or RVH  ST elevation, consider inferior injury          Current Facility-Administered Medications:     acetaminophen (TYLENOL) tablet 650 mg, 650 mg, Oral, Q4H PRN, Calin Starkey MD, 650 mg at 08/28/24 1028    amLODIPine (NORVASC) tablet 10 mg, 10 mg, Oral, Daily, Calin Starkey MD, 10 mg at 08/28/24 0924    aspirin chewable tablet 81 mg, 81 mg, Oral, Daily, Calin Starkey MD, 81 mg at 08/28/24 0924    bisacodyl (DULCOLAX) suppository 10 mg, 10 mg, Rectal, Daily PRN, Calin Starkey MD    cetirizine (zyrTEC) tablet 10 mg, 10 mg, Oral, Daily, Calin Starkey MD, 10 mg at 08/28/24 0923    docusate sodium (COLACE) capsule 100 mg, 100 mg, Oral, BID, Calin Starkey MD, 100 mg at 08/28/24 0924    escitalopram (LEXAPRO) tablet 10 mg, 10 mg, Oral, Nightly, Calin Starkey MD, 10 mg at 08/27/24 2017    famotidine (PEPCID) tablet 20 mg, 20 mg, Oral, BID, Calin Starkey MD, 20 mg at 08/28/24 0924    folic acid (FOLVITE) tablet 1 mg, 1 mg, Oral, Daily, Calin Starkey MD, 1 mg at 08/28/24 0923    LORazepam (ATIVAN) tablet 0.5 mg, 0.5 mg, Oral, Q4H PRN, Calin Starkey MD, 0.5 mg at 08/27/24 2343    metoprolol tartrate (LOPRESSOR) tablet 12.5 mg, 12.5 mg, Oral, Q12H, Calin Starkey MD, 12.5 mg at 08/28/24 0924    multivitamin (THERAGRAN) tablet 1 tablet, 1 tablet, Oral, Daily, Calin Starkey MD, 1 tablet at 08/28/24 0923    polyethylene glycol (MIRALAX) packet 17 g, 17 g, Oral, Daily, Calin Starkey MD, 17 g at 08/28/24 0924    [COMPLETED] Insert Peripheral IV, , , Once **AND** sodium chloride 0.9 % flush 10 mL, 10 mL, Intravenous, PRN, Newton Johnson II, MD    sodium chloride 0.9 % with KCl 20 mEq/L infusion, 75 mL/hr, Intravenous, Continuous, Calin Starkey MD, Last Rate: 75 mL/hr at 08/28/24 0146, 75 mL/hr at 08/28/24 0146    thiamine (VITAMIN B-1) tablet 200 mg, 200 mg, Oral, Daily, Calin Starkey MD, 200 mg at 08/28/24 0924      ASSESSMENT  Status post alcohol intoxication  Metabolic encephalopathy resolved  Hypertension  Hyperlipidemia  Anxiety disorder  Depression  Chronic anemia  Chronic constipation resolved  Gastroesophageal reflux disease    PLAN  Discharge home  Discharge summary dictated    SOFYA SEGURA MD    Copied text in this note has been reviewed and is accurate as of 08/28/24

## 2024-08-29 NOTE — PROGRESS NOTES
"Enter Query Response Below      Query Response:       Metabolic encephalopathy Electronically signed by Calin Starkey MD, 24, 12:47 PM EDT.         If applicable, please update the problem list.     Patient: Grazyna Fritz        : 1955  Account: [id number]           Admit Date: 2024        How to Respond to this query:       a. Click New Note     b. Answer query within the yellow box.                c. Update the Problem List, if applicable.    If you have any questions about this query contact me at: [email or web address]    Dr. Starkey    69-year-old female admitted  (OBS),  (IP) with \"alcohol intoxication\" per the discharge summary.    ED note \"c/o acute  altered mental status.  This been ongoing for about 6 months.  She has had worsening symptoms ever since her  .  Today family notes that she is been confused to the time of day, and she was also asking repetitive questions.\"  H&P-  progress note \"NEURO: Awake and alert with no focal deficit\"    After study, was metabolic encephalopathy clinically supported during this admission?  Metabolic encephalopathy was supported with additional clinical indicators:____________  Metabolic encephalopathy was not supported  Other- specify_____________  Unable to determine    By submitting this query, we are merely seeking further clarification of documentation to accurately reflect all conditions that you are monitoring, evaluating, treating or that extend the hospitalization or utilize additional resources of care. Please utilize your independent clinical judgment when addressing the question(s) above.     This query and your response, once completed, will be entered into the legal medical record.    Sincerely,  Carlota Gimenez MSN, RN, CCDS  Clinical Documentation Integrity Program    "

## 2024-08-29 NOTE — PROGRESS NOTES
"Enter Query Response Below      Query Response:       Malnutrition Electronically signed by Calin Starkey MD, 24, 12:48 PM EDT.         If applicable, please update the problem list.       Patient: Grazyna Fritz        : 1955  Account: [id number]           Admit Date: 2024        How to Respond to this query:       a. Click New Note     b. Answer query within the yellow box.                c. Update the Problem List, if applicable.      If you have any questions about this query contact me at: [email or web address]    Dr. Starkey    69-year-old female admitted  (OBS),  (IP) with \"alcohol intoxication\" per the discharge summary.    malnutrition severity assessment by the registered dietician \"BMI 19.3\",   \"Patient meets ASPEN/AND criteria for nutrition diagnosis of severe malnutrition of chronic illness based on: inadequate energy intake > 3 months, NFPE results    Plan/recs:  Boost plus BID   May benefit in laxative/BM regimen Encourage healthy nutrition/PO intake\", \"Muscle Loss- Severe\", \"Fat Loss- Severe\"    Please clarify diagnosis treated/monitored:  Chronic illness related severe protein calorie malnutrition  Malnutrition  Underweight  Other- specify __________  Unable to determine   By submitting this query, we are merely seeking further clarification of documentation to accurately reflect all conditions that you are monitoring, evaluating, treating or that extend the hospitalization or utilize additional resources of care. Please utilize your independent clinical judgment when addressing the question(s) above.     This query and your response, once completed, will be entered into the legal medical record.    Sincerely,  Carlota Gimenez MSN, RN, CCDS  Clinical Documentation Integrity Program    "

## 2024-08-29 NOTE — PROGRESS NOTES
Case Management Discharge Note      Final Note: Discharged home with [facility]. Haily Garcia RN    Provided Post Acute Provider List?: N/A  N/A Provider List Comment: Denies needs. Plan is home    Selected Continued Care - Discharged on 8/28/2024 Admission date: 8/25/2024 - Discharge disposition: Home or Self Care         Transportation Services  Private: Car    Final Discharge Disposition Code: 06 - home with home health care

## (undated) DEVICE — KT VLV BIOGUARD SXN BIOP AIR/H20 CONN 4PC DISP

## (undated) DEVICE — BLD SHV DBL/CUT COOLCUT 4MM 13CM

## (undated) DEVICE — PRECISION THIN (9.0 X 0.38 X 25.0MM)

## (undated) DEVICE — SUT ETHIB 2 CV V37 MS/4 30IN MX69G

## (undated) DEVICE — GLV SURG SENSICARE GREEN W/ALOE PF LF 8.5 STRL

## (undated) DEVICE — GLV SURG SIGNATURE ESSENTIAL PF LTX SZ8.5

## (undated) DEVICE — REFLECTION SPHERICAL HEAD SCREW 35MM
Type: IMPLANTABLE DEVICE | Site: HIP | Status: NON-FUNCTIONAL
Brand: REFLECTION

## (undated) DEVICE — PREMIUM WET SKIN PREP TRAY: Brand: MEDLINE INDUSTRIES, INC.

## (undated) DEVICE — Device

## (undated) DEVICE — UNDERCAST PADDING: Brand: DEROYAL

## (undated) DEVICE — APPL CHLORAPREP HI/LITE 26ML ORNG

## (undated) DEVICE — SPNG GZ WOVN 4X4IN 12PLY 10/BX STRL

## (undated) DEVICE — PK ARTHSCP 40

## (undated) DEVICE — ZIP 16 SURGICAL SKIN CLOSURE DEVICE, PSA: Brand: ZIP 16 SURGICAL SKIN CLOSURE DEVICE

## (undated) DEVICE — DISPOSABLE TOURNIQUET CUFF SINGLE BLADDER, SINGLE PORT AND QUICK CONNECT CONNECTOR: Brand: COLOR CUFF

## (undated) DEVICE — VAPR TRIPOLAR 90 DEGREES SUCTION ELECTRODE 90 DEGREES SUCTION WITH INTEGRATED HANDPIECE AND HAND CONTROLS: Brand: VAPR

## (undated) DEVICE — S/M FLEXIBLE ALEXIS ORTHOPAEDIC PROTECTOR: Brand: ALEXIS® ORTHOPAEDIC PROTECTOR

## (undated) DEVICE — GLV SURG SIGNATURE ESSENTIAL PF LTX SZ7.5

## (undated) DEVICE — MAT FLR ABSORBENT LG 4FT 10 2.5FT

## (undated) DEVICE — KWIRE 1.6MM NS 6IN
Type: IMPLANTABLE DEVICE | Status: NON-FUNCTIONAL
Removed: 2017-06-26

## (undated) DEVICE — SUT ETHIB 0/0 MO6 I8IN CX45D

## (undated) DEVICE — CANN NASL CO2 TRULINK W/O2 A/

## (undated) DEVICE — TUBING SET, GRAVITY, 4-SPIKE

## (undated) DEVICE — PAD,ABDOMINAL,8"X10",ST,LF: Brand: MEDLINE

## (undated) DEVICE — ENCORE® LATEX ORTHO SIZE 8, STERILE LATEX POWDER-FREE SURGICAL GLOVE: Brand: ENCORE

## (undated) DEVICE — PK ORTHO MINOR TOWER 40

## (undated) DEVICE — SOL NACL 0.9PCT 100ML SGL

## (undated) DEVICE — STCKNT IMPERV 12IN STRL

## (undated) DEVICE — 3M™ IOBAN™ 2 ANTIMICROBIAL INCISE DRAPE 6640EZ: Brand: IOBAN™ 2

## (undated) DEVICE — PETROLATUM DRESSING. FINE MESH GAUZE IMPREGNATED WITH 3% BISMUTH TRIBROMOPHENATE  IN A PETROLATUM BLEND.: Brand: XEROFORM PETROLATUM

## (undated) DEVICE — SOL ISO/ALC RUB 70PCT 4OZ

## (undated) DEVICE — GOWN ,SIRUS,NONREINFORCED SMALL: Brand: MEDLINE

## (undated) DEVICE — SUT VIC 2/0 X1 27IN J459H

## (undated) DEVICE — DRSNG WND GZ CURAD OIL EMULSION 3X3IN STRL

## (undated) DEVICE — Device: Brand: ACCUMIX® MIXING SYSTEM

## (undated) DEVICE — TUBING, SUCTION, 1/4" X 20', STRAIGHT: Brand: MEDLINE INDUSTRIES, INC.

## (undated) DEVICE — BIT DRL 2.5MM

## (undated) DEVICE — GLV SURG BIOGEL LTX PF 7

## (undated) DEVICE — PK ANT HIP 40

## (undated) DEVICE — BIT DRL MAXTORQUE STD 2.4MM NS

## (undated) DEVICE — DRSNG PAD ABD 8X10IN STRL

## (undated) DEVICE — NEEDLE, QUINCKE, 20GX3.5": Brand: MEDLINE

## (undated) DEVICE — TRAP FLD MINIVAC MEGADYNE 100ML

## (undated) DEVICE — KT KN SCP W/ACCUPORT DS

## (undated) DEVICE — SUT ETHLN 4/0 PS2 PLSTC 1667G

## (undated) DEVICE — SENSR O2 OXIMAX FNGR A/ 18IN NONSTR

## (undated) DEVICE — RECIPROCATING BLADE HEAVY DUTY LONG, OFFSET  (77.6 X 0.77 X 11.2MM)

## (undated) DEVICE — GLV SURG SENSICARE PI MIC PF SZ7 LF STRL

## (undated) DEVICE — BIT DRL MAXLOCK EXTRM STD 2.7MM

## (undated) DEVICE — DUAL CUT SAGITTAL BLADE

## (undated) DEVICE — DRAPE,REIN 53X77,STERILE: Brand: MEDLINE

## (undated) DEVICE — GLV SURG PREMIERPRO ORTHO LTX PF SZ8.5 BRN

## (undated) DEVICE — PROXIMATE RH ROTATING HEAD SKIN STAPLERS (35 WIDE) CONTAINS 35 STAINLESS STEEL STAPLES: Brand: PROXIMATE

## (undated) DEVICE — PIN FIX BB TAK THRD

## (undated) DEVICE — TUBING, SUCTION, 1/4" X 10', STRAIGHT: Brand: MEDLINE

## (undated) DEVICE — 450 ML BOTTLE OF 0.05% CHLORHEXIDINE GLUCONATE IN 99.95% STERILE WATER FOR IRRIGATION, USP AND APPLICATOR.: Brand: IRRISEPT ANTIMICROBIAL WOUND LAVAGE

## (undated) DEVICE — GLV SURG BIOGEL LTX PF 6 1/2

## (undated) DEVICE — TBG PENCL TELESCP MEGADYNE SMOKE EVAC 10FT

## (undated) DEVICE — DRP C/ARM 41X74IN

## (undated) DEVICE — SKIN PREP TRAY W/CHG: Brand: MEDLINE INDUSTRIES, INC.

## (undated) DEVICE — GLV SURG BIOGEL LTX PF 8

## (undated) DEVICE — THE TORRENT IRRIGATION SCOPE CONNECTOR IS USED WITH THE TORRENT IRRIGATION TUBING TO PROVIDE IRRIGATION FLUIDS SUCH AS STERILE WATER DURING GASTROINTESTINAL ENDOSCOPIC PROCEDURES WHEN USED IN CONJUNCTION WITH AN IRRIGATION PUMP (OR ELECTROSURGICAL UNIT).: Brand: TORRENT